# Patient Record
Sex: FEMALE | Race: WHITE | Employment: UNEMPLOYED | URBAN - METROPOLITAN AREA
[De-identification: names, ages, dates, MRNs, and addresses within clinical notes are randomized per-mention and may not be internally consistent; named-entity substitution may affect disease eponyms.]

---

## 2021-03-22 ENCOUNTER — APPOINTMENT (OUTPATIENT)
Dept: GENERAL RADIOLOGY | Age: 81
End: 2021-03-22
Payer: COMMERCIAL

## 2021-03-22 ENCOUNTER — HOSPITAL ENCOUNTER (EMERGENCY)
Age: 81
Discharge: HOME | End: 2021-03-22
Attending: EMERGENCY MEDICINE
Payer: COMMERCIAL

## 2021-03-22 VITALS
HEIGHT: 63 IN | DIASTOLIC BLOOD PRESSURE: 94 MMHG | TEMPERATURE: 98.2 F | SYSTOLIC BLOOD PRESSURE: 166 MMHG | OXYGEN SATURATION: 98 % | BODY MASS INDEX: 20.2 KG/M2 | RESPIRATION RATE: 16 BRPM | HEART RATE: 98 BPM | WEIGHT: 114 LBS

## 2021-03-22 DIAGNOSIS — S92.335A CLOSED NONDISPLACED FRACTURE OF THIRD METATARSAL BONE OF LEFT FOOT, INITIAL ENCOUNTER: Primary | ICD-10-CM

## 2021-03-22 LAB
ABSOLUTE EOS #: 0.1 K/UL (ref 0–0.4)
ABSOLUTE IMMATURE GRANULOCYTE: ABNORMAL K/UL (ref 0–0.3)
ABSOLUTE LYMPH #: 1.4 K/UL (ref 1–4.8)
ABSOLUTE MONO #: 0.5 K/UL (ref 0.1–1.3)
ANION GAP SERPL CALCULATED.3IONS-SCNC: 12 MMOL/L (ref 9–17)
BASOPHILS # BLD: 1 % (ref 0–2)
BASOPHILS ABSOLUTE: 0.1 K/UL (ref 0–0.2)
BUN BLDV-MCNC: 11 MG/DL (ref 8–23)
BUN/CREAT BLD: ABNORMAL (ref 9–20)
CALCIUM SERPL-MCNC: 9.7 MG/DL (ref 8.6–10.4)
CHLORIDE BLD-SCNC: 98 MMOL/L (ref 98–107)
CO2: 25 MMOL/L (ref 20–31)
CREAT SERPL-MCNC: 0.7 MG/DL (ref 0.5–0.9)
DIFFERENTIAL TYPE: ABNORMAL
EOSINOPHILS RELATIVE PERCENT: 1 % (ref 0–4)
GFR AFRICAN AMERICAN: >60 ML/MIN
GFR NON-AFRICAN AMERICAN: >60 ML/MIN
GFR SERPL CREATININE-BSD FRML MDRD: ABNORMAL ML/MIN/{1.73_M2}
GFR SERPL CREATININE-BSD FRML MDRD: ABNORMAL ML/MIN/{1.73_M2}
GLUCOSE BLD-MCNC: 142 MG/DL (ref 70–99)
HCT VFR BLD CALC: 36.4 % (ref 36–46)
HEMOGLOBIN: 12.1 G/DL (ref 12–16)
IMMATURE GRANULOCYTES: ABNORMAL %
LYMPHOCYTES # BLD: 22 % (ref 24–44)
MCH RBC QN AUTO: 28.8 PG (ref 26–34)
MCHC RBC AUTO-ENTMCNC: 33.2 G/DL (ref 31–37)
MCV RBC AUTO: 86.7 FL (ref 80–100)
MONOCYTES # BLD: 8 % (ref 1–7)
NRBC AUTOMATED: ABNORMAL PER 100 WBC
PDW BLD-RTO: 13.1 % (ref 11.5–14.9)
PLATELET # BLD: 201 K/UL (ref 150–450)
PLATELET ESTIMATE: ABNORMAL
PMV BLD AUTO: 8.1 FL (ref 6–12)
POTASSIUM SERPL-SCNC: 3.8 MMOL/L (ref 3.7–5.3)
RBC # BLD: 4.2 M/UL (ref 4–5.2)
RBC # BLD: ABNORMAL 10*6/UL
SEG NEUTROPHILS: 68 % (ref 36–66)
SEGMENTED NEUTROPHILS ABSOLUTE COUNT: 4.3 K/UL (ref 1.3–9.1)
SODIUM BLD-SCNC: 135 MMOL/L (ref 135–144)
URIC ACID: 3.4 MG/DL (ref 2.4–5.7)
WBC # BLD: 6.4 K/UL (ref 3.5–11)
WBC # BLD: ABNORMAL 10*3/UL

## 2021-03-22 PROCEDURE — 99284 EMERGENCY DEPT VISIT MOD MDM: CPT

## 2021-03-22 PROCEDURE — 84550 ASSAY OF BLOOD/URIC ACID: CPT

## 2021-03-22 PROCEDURE — 73630 X-RAY EXAM OF FOOT: CPT

## 2021-03-22 PROCEDURE — 99283 EMERGENCY DEPT VISIT LOW MDM: CPT

## 2021-03-22 PROCEDURE — 85025 COMPLETE CBC W/AUTO DIFF WBC: CPT

## 2021-03-22 PROCEDURE — 80048 BASIC METABOLIC PNL TOTAL CA: CPT

## 2021-03-22 PROCEDURE — 36415 COLL VENOUS BLD VENIPUNCTURE: CPT

## 2021-03-22 PROCEDURE — 73610 X-RAY EXAM OF ANKLE: CPT

## 2021-03-22 ASSESSMENT — PAIN SCALES - GENERAL: PAINLEVEL_OUTOF10: 1

## 2021-03-22 ASSESSMENT — PAIN DESCRIPTION - LOCATION: LOCATION: FOOT

## 2021-03-22 NOTE — ED NOTES
Pt arrived with a complaint of left foot swelling x2-3 weeks. Pt is unsure of any injury to the foot.       Digna Orourke  03/22/21 3877

## 2021-03-22 NOTE — ED NOTES
Patient instructed on post-op shoe use. Pt verbalized understanding and denied any questions at this time.       Reva Jackson  03/22/21 0054

## 2021-03-22 NOTE — ED PROVIDER NOTES
[address]  eMERGENCY dEPARTMENT eNCOUnter      Pt Name: Ting Alexis  MRN: [id number]  [place] 1940  Date of evaluation: 3/22/2021  Provider: Maryan Euceda PA-C    CHIEF COMPLAINT       Chief Complaint   Patient presents with    Foot Swelling     left     Foot Pain           HISTORY OF PRESENT ILLNESS  (Location/Symptom, Timing/Onset, Context/Setting, Quality, Duration, Modifying Factors, Severity.)   Ting Alexis is a 78 y.o. female with hx hypothyroidism presents to the emergency department with complaints of left foot pain and swelling. Patient reports 1 month ago she was reaching up to get something out of a cabinet when she felt a pain in her left foot. Patient states her foot was painful and a little swollen for a week. Patient states symptoms seem to completely improve until 2 weeks ago. Pt thinks a canned good fell onto her left foot 2 weeks ago but is not completely sure. Patient states the pain is a dull pinprick sensation. Reports some pain in her toes. Denies any calf pain or swelling. No numbness or tingling. No chest pain shortness of breath fevers chills nausea. No history of DVT. No history of gout. She is not diabetic. No other complaints. Nursing Notes were reviewed. REVIEW OF SYSTEMS    (2-9 systems for level 4, 10 or more for level 5)     Review of Systems   Foot pain  Foot swelling      Except as noted above the remainder of the review of systems was reviewed and negative.        PAST MEDICAL HISTORY     Past Medical History:   Diagnosis Date    Hypertension     Hypothyroidism     Osteoporosis     Uterovaginal prolapse      None otherwise stated in nurses notes    SURGICAL HISTORY       Past Surgical History:   Procedure Laterality Date    ELBOW SURGERY  2010    TONSILLECTOMY      TUBAL LIGATION      WRIST SURGERY  1996     None otherwise stated in nurses notes    CURRENT MEDICATIONS       Previous Medications    ASCORBIC ACID (VITAMIN C) 500 MG TABLET    Take 500 mg by mouth 2 times daily. CALCIUM CARBONATE 600 MG TABS TABLET    Take 1 tablet by mouth 2 times daily. LEVOTHYROXINE (LEVOTHROID) 50 MCG TABLET    Take 50 mcg by mouth daily. Takes 50 mcg 5 days a week  Then 100 mcg sat and sun. MULTIPLE VITAMINS-IRON (MULTI-VITAMIN/IRON PO)    Take 1 tablet by mouth daily. PROBIOTIC PRODUCT (PROBIOTIC ACIDOPHILUS PO)    Take  by mouth Daily. CLARIFY DOSAGE    VITAMIN E 400 UNIT CAPSULE    Take 400 Units by mouth daily. CLARIFY DOSAGE     VITAMINS-LIPOTROPICS (B-100 PO)    Take 1 tablet by mouth. ALLERGIES     Latex, Morphine, Alendronate sodium, Ceclor [cefaclor], Codeine, Daypro [oxaprozin], Seldane [terfenadine], and Ultram [tramadol hcl]    FAMILY HISTORY     History reviewed. No pertinent family history. No family status information on file. None otherwise stated in nurses notes    SOCIAL HISTORY      reports that she quit smoking about 51 years ago. She has a 4.00 pack-year smoking history. She has never used smokeless tobacco. She reports that she does not drink alcohol or use drugs. lives at home with others     PHYSICAL EXAM    (up to 7 for level 4, 8 or more for level 5)     ED Triage Vitals [03/22/21 1029]   BP Temp Temp Source Pulse Resp SpO2 Height Weight   (!) 166/94 98.2 °F (36.8 °C) Oral 98 16 98 % 5' 3\" (1.6 m) 114 lb (51.7 kg)       Physical Exam   Nursing note and vitals reviewed. Constitutional: Oriented to person, place, and time and well-developed, well-nourished. Head: Normocephalic and atraumatic. Ear: External ears normal.   Nose: Nose normal and midline. Eyes: Conjunctivae and EOM are normal. Pupils are equal, round, and reactive to light. Neck: Normal range of motion. Neck supple. Throat: Posterior pharynx is without erythema or exudates, airway is patent, no swelling  Cardiovascular: Normal rate, regular rhythm, normal heart sounds and intact distal pulses.     Pulmonary/Chest: Effort normal and breath sounds normal. No respiratory distress. No wheezes. No rales. No chest tenderness. Abdominal: Soft. Bowel sounds are normal. No distension and no mass. There is no tenderness. There is no rebound and no guarding. Musculoskeletal: Examination of left foot reveals swelling. No is no erythema or warmth. There is tenderness over the distal metatarsals and 1st-3rd toes. There is no ankle tenderness. 5/5 strength. Brisk cap refill. Distal sensation intact. 2/2 dp and pt pulses. No calf pain or swelling. No calf erythema, warmth. No abrasions or breaks in skin. Ambulatory. Neurological: Alert and oriented to person, place, and time. GCS score is 15. Skin: Skin is warm and dry. No rash noted. No erythema. No pallor. Psychiatric: Mood, memory, affect and judgment normal.           DIAGNOSTIC RESULTS     EKG: All EKG's are interpreted by the Emergency Department Physician who either signs or Co-signs this chart in the absence of a cardiologist.        RADIOLOGY:   All plain film, CT, MRI, and formal ultrasound images (except ED bedside ultrasound) are read by the radiologist, see reports below, unless otherwise noted in MDM or here. XR FOOT LEFT (MIN 3 VIEWS)   Final Result   Subacute healing fracture 3rd metatarsal neck with associated callus   formation and swelling at the forefoot. Background degenerative changes and osseous demineralization which limits   radiographic evaluation. XR ANKLE LEFT (MIN 3 VIEWS)   Final Result   Subacute healing fracture 3rd metatarsal neck with associated callus   formation and swelling at the forefoot. Background degenerative changes and osseous demineralization which limits   radiographic evaluation. No results found.           LABS:  Labs Reviewed   CBC WITH AUTO DIFFERENTIAL - Abnormal; Notable for the following components:       Result Value    Seg Neutrophils 68 (*)     Lymphocytes 22 (*)     Monocytes 8 (*)     All other patient a cane. I recommend using a walker. Pt has been walking around on fracture for 2-3 weeks. If patient does not feel comfortable wearing post op shoe she can wear a normal shoe due to fall risk and follow up with podiatry for recommendations. Recommend no long walks. If up walking always use a walker. Pt is agreeable. Discussed results and plan with the pt. They expressed appropriate understanding. Pt given close follow up, supportive care instructions and strict return instructions at the bedside. ED Medications administered this visit:  Medications - No data to display    New Prescriptions from this visit:    New Prescriptions    No medications on file       Follow-up:  MD Colton [place] [id number] [facility] Daryle [place]fei   [facility] [address]  [place] [id number]  [address] [place], [place] [id number] [phone] [facility]  [phone]    Call           Final Impression:   1.  Closed nondisplaced fracture of third metatarsal bone of left foot, initial encounter               (Please note that portions of this note were completed with a voice recognition program.  Efforts were made to edit the dictations but occasionally words are mis-transcribed.)      (Please note that portions of this note were completed with a voice recognition program.  Efforts were made to edit the dictations but occasionally words are mis-transcribed.)    Micah Frias, [address] [facility], [facility]  03/22/21 5692

## 2021-03-23 NOTE — ED PROVIDER NOTES
[address] ED  eMERGENCY dEPARTMENT eNCOUnter   Independent Attestation     Pt Name: Janna Rahman  MRN: [id number]  [place] 1940  Date of evaluation: 3/22/21   Janna Rahman is a 78 y.o. female who presents with Foot Swelling (left ) and Foot Pain    Vitals:   Vitals:    03/22/21 1029   BP: (!) 166/94   Pulse: 98   Resp: 16   Temp: 98.2 °F (36.8 °C)   TempSrc: Oral   SpO2: 98%   Weight: 114 lb (51.7 kg)   Height: 5' 3\" (1.6 m)     Impression:   1. Closed nondisplaced fracture of third metatarsal bone of left foot, initial encounter      I was personally available for consultation in the Emergency Department. I have reviewed the chart and agree with the documentation as recorded by the [facility], including the assessment, treatment plan and disposition.   Marty Warner MD  Attending Emergency  Physician                          Marty Warner MD  03/22/21 4292

## 2021-03-26 DIAGNOSIS — M79.672 LEFT FOOT PAIN: Primary | ICD-10-CM

## 2021-03-29 ENCOUNTER — OFFICE VISIT (OUTPATIENT)
Dept: ORTHOPEDIC SURGERY | Age: 81
End: 2021-03-29
Payer: COMMERCIAL

## 2021-03-29 VITALS
RESPIRATION RATE: 18 BRPM | BODY MASS INDEX: 20.2 KG/M2 | WEIGHT: 114 LBS | TEMPERATURE: 98.3 F | OXYGEN SATURATION: 98 % | HEIGHT: 63 IN | HEART RATE: 78 BPM

## 2021-03-29 DIAGNOSIS — S92.302A MULTIPLE CLOSED FRACTURES OF METATARSAL BONE OF LEFT FOOT, INITIAL ENCOUNTER: Primary | ICD-10-CM

## 2021-03-29 PROCEDURE — 99203 OFFICE O/P NEW LOW 30 MIN: CPT | Performed by: ORTHOPAEDIC SURGERY

## 2021-03-29 NOTE — PROGRESS NOTES
[facility]  [address] [facility] [address]  Dept: [phone]    Ambulatory Orthopedic Consult      CHIEF COMPLAINT:    Chief Complaint   Patient presents with    Foot Pain     left       HISTORY OF PRESENT ILLNESS:      The patient is a 78 y.o. female who is being seen for evaluation of pain at the above location at the forefoot/midfoot, secondary to an injury that occurred around 2/1/2021. The patient reports that she dropped a canned good on the dorsal aspect of her foot, and then dropped another can of soup on her foot about a week later. She also reports a third instance of bumping her foot and/or dropping something on it prior to this visit since her initial injury. REVIEW OF SYSTEMS:  Constitutional: Negative for fever. HENT: Negative for tinnitus. Eyes: Negative for pain. Respiratory: Negative for shortness of breath. Cardiovascular: Negative for chest pain. Gastrointestinal: Negative for abdominal pain. Genitourinary: Negative for dysuria. Skin: Negative for rash. Neurological: Negative for headaches. Hematological: Does not bruise/bleed easily. Musculoskeletal: See HPI for pertinent positives     Past Medical History:    She  has a past medical history of Hypertension, Hypothyroidism, Osteoporosis, and Uterovaginal prolapse. Past Surgical History:    She  has a past surgical history that includes Elbow surgery (2010); Tubal ligation; Tonsillectomy; and Wrist surgery (1996). Current Medications:     Current Outpatient Medications:     Misc Natural Products (LUTEIN 20) CAPS, Take by mouth, Disp: , Rfl:     calcium carbonate 600 MG TABS tablet, Take 1 tablet by mouth 2 times daily. , Disp: , Rfl:     levothyroxine (LEVOTHROID) 50 MCG tablet, Take 50 mcg by mouth daily.  Takes 50 mcg 5 days a week  Then 100 mcg sat and sun., Disp: , Rfl:     Vitamins-Lipotropics (B-100 PO), Take 1 tablet by mouth.  , Disp: , Rfl:     Multiple Vitamins-Iron (MULTI-VITAMIN/IRON PO), Take 1 tablet by mouth daily. , Disp: , Rfl:     Ascorbic Acid (VITAMIN C) 500 MG tablet, Take 500 mg by mouth 2 times daily. , Disp: , Rfl:     vitamin E 400 UNIT capsule, Take 400 Units by mouth daily. CLARIFY DOSAGE , Disp: , Rfl:      Allergies:    Latex, Morphine, Alendronate sodium, Ceclor [cefaclor], Codeine, Daypro [oxaprozin], Seldane [terfenadine], and Ultram [tramadol hcl]    Family History:  family history is not on file. Social History:   Social History     Occupational History    Not on file   Tobacco Use    Smoking status: Former Smoker     Packs/day: 0.50     Years: 8.00     Pack years: 4.00     Quit date: 12/15/1969     Years since quittin.3    Smokeless tobacco: Never Used   Substance and Sexual Activity    Alcohol use: No    Drug use: No    Sexual activity: Not Currently     Occupation: Retired     OBJECTIVE:  Pulse 78   Temp 98.3 °F (36.8 °C)   Resp 18   Ht 5' 3\" (1.6 m)   Wt 114 lb (51.7 kg)   SpO2 98%   BMI 20.19 kg/m²    Psych: alert and oriented to person, time, and place  Cardio:  well perfused extremities  Resp:  normal respiratory effort  Skin:  no cyanosis  Hem/lymph:  no lymphedema  Neuro:  sensation to light touch grossly intact throughout all nerve distributions in the foot   Musculoskeletal:    MUSCULOSKELETAL (affected lower extremity):  Vascular: Toes warm and well perfused, compartments soft/compressible, mild swelling of ankle/foot. Skin:  Intact over foot/ankle, without rash/lesions/AV malformations.    Motion: Able to wiggle toes  -Range of motion not tested due to pain  -No tenderness at knee or proximal leg   -Tenderness to palpation: Forefoot/midfoot      RADIOLOGY:   3/29/2021 FINDINGS:  Three views (AP, Mortise, and Lateral) of the left ankle and three views (AP, Oblique, Lateral) of the left foot were obtained in the office today and reviewed, revealing fractures at the distal second and third metatarsal shafts with mild shortening, and healing noted. IMPRESSION:  Osseous injury as above. Electronically signed by Adam Mckeon MD      ASSESSMENT AND PLAN:  Body mass index is 20.19 kg/m². She has second and third distal metatarsal shaft fractures with mild shortening, sustained around 2/1/2021. Notably, she has the past medical history as above. She has a history of hypothyroidism and osteoporosis. We had a discussion today about the likely diagnosis and its natural history, physical exam and imaging findings, as well as various treatment options in detail. Surgically, we discussed operative fixation. We did discuss that I do not believe she would benefit from surgery, and she agrees, and wishes to avoid surgery. Orders/referrals were placed as below at today's visit. The patient will avoid the routine use of NSAIDs to prevent the theoretical risk of delayed healing. The patient will avoid pain provoking activity. The patient will use the following, for stability and pain control, when ambulatory:   heel wedge cast shoe. All questions were answered and the above plan was agreed upon. The patient will return to clinic in 1 month with left foot x-rays.           At the patient's next visit, depending on how the patient is doing and/or new imaging/labs results, we may consider the following options:    []  Orthotic (OTC)     []  Orthotic (custom)          []  Rocker bottom shoes     []  Brace (OTC)        []  Brace (custom)             []  CAM boot        []  Night splint         []  Heel cups        []  Strap      []  Toe sleeves/splints    []  PT:                     []  Wean out of immobilization   []  Advance activity       []  Topical               []  NSAIDs          []  Josiah         []  Referral:         []  Stress xrays       []  CT         []  MRI        []  Injection:         []  Consider OR      []  Pick OR date    Return in about 4 weeks (around 4/26/2021). No orders of the defined types were placed in this encounter. No orders of the defined types were placed in this encounter. Kaleigh Mann MD  Orthopedic Surgery        Please excuse any typos/errors, as this note was created with the assistance of voice recognition software. While intending to generate a document that actually reflects the content of the visit, the document can still have some errors including those of syntax and sound-a-like substitutions which may escape proof reading. In such instances, actual meaning can be extrapolated by context.

## 2021-04-21 DIAGNOSIS — M79.672 LEFT FOOT PAIN: Primary | ICD-10-CM

## 2021-04-26 ENCOUNTER — OFFICE VISIT (OUTPATIENT)
Dept: ORTHOPEDIC SURGERY | Age: 81
End: 2021-04-26
Payer: COMMERCIAL

## 2021-04-26 VITALS — WEIGHT: 114 LBS | HEIGHT: 63 IN | HEART RATE: 75 BPM | BODY MASS INDEX: 20.2 KG/M2

## 2021-04-26 DIAGNOSIS — S92.302D MULTIPLE CLOSED FRACTURES OF METATARSAL BONE OF LEFT FOOT WITH ROUTINE HEALING, SUBSEQUENT ENCOUNTER: Primary | ICD-10-CM

## 2021-04-26 PROCEDURE — 99213 OFFICE O/P EST LOW 20 MIN: CPT | Performed by: ORTHOPAEDIC SURGERY

## 2021-04-26 NOTE — PROGRESS NOTES
normal  -Tenderness to palpation: Forefoot/midfoot--improved      RADIOLOGY:   4/26/2021 FINDINGS:  Three views (AP, Oblique, Lateral) of the left foot were obtained in the office today and reviewed, revealing fractures at the distal second and third metatarsal shafts with mild shortening. No interval displacement, with interval healing is noted. IMPRESSION:  Osseous injury as above. Electronically signed by John Oviedo MD      ASSESSMENT AND PLAN:  Body mass index is 20.19 kg/m². She has second and third distal metatarsal shaft fractures with mild shortening, sustained around 2/1/2021. She is doing well overall with conservative management. Notably, she has the past medical history as above. She has a history of hypothyroidism and osteoporosis. We had a discussion today about the likely diagnosis and its natural history, physical exam and imaging findings, as well as various treatment options in detail. Surgically, we discussed that I recommended continuing conservative management, as she is healing well. I did not recommend any surgical intervention at this time. Orders/referrals were placed as below at today's visit. She will continue to avoid the routine use of NSAIDs, and pain from activity. She may continue to advance her activity, and wean out of the heel wedge cast shoe, into a regular supportive shoe. All questions were answered and the above plan was agreed upon. The patient will return to clinic in 8 weeks with repeat left foot x-rays.              At the patient's next visit, depending on how the patient is doing and/or new imaging/labs results, we may consider the following options:    []  Orthotic (OTC)     []  Orthotic (custom)          []  Rocker bottom shoes     []  Brace (OTC)        []  Brace (custom)             []  CAM boot        []  Night splint         []  Heel cups        []  Strap      []  Toe sleeves/splints    []  PT:                     []  Wean out

## 2021-05-29 ENCOUNTER — APPOINTMENT (OUTPATIENT)
Dept: GENERAL RADIOLOGY | Age: 81
DRG: 563 | End: 2021-05-29
Payer: COMMERCIAL

## 2021-05-29 ENCOUNTER — HOSPITAL ENCOUNTER (INPATIENT)
Age: 81
LOS: 4 days | Discharge: NURSING, REHAB OR LONG-TERM CARE | DRG: 563 | End: 2021-06-02
Attending: EMERGENCY MEDICINE | Admitting: INTERNAL MEDICINE
Payer: COMMERCIAL

## 2021-05-29 DIAGNOSIS — S42.291A FRACTURE OF HUMERAL HEAD, CLOSED, RIGHT, INITIAL ENCOUNTER: Primary | ICD-10-CM

## 2021-05-29 LAB
ABSOLUTE BANDS #: 0.34 K/UL (ref 0–1)
ABSOLUTE EOS #: 0 K/UL (ref 0–0.4)
ABSOLUTE IMMATURE GRANULOCYTE: ABNORMAL K/UL (ref 0–0.3)
ABSOLUTE LYMPH #: 0.23 K/UL (ref 1–4.8)
ABSOLUTE MONO #: 0.11 K/UL (ref 0.1–1.3)
ANION GAP SERPL CALCULATED.3IONS-SCNC: 13 MMOL/L (ref 9–17)
BANDS: 3 % (ref 0–10)
BASOPHILS # BLD: 0 % (ref 0–2)
BASOPHILS ABSOLUTE: 0 K/UL (ref 0–0.2)
BUN BLDV-MCNC: 17 MG/DL (ref 8–23)
BUN/CREAT BLD: ABNORMAL (ref 9–20)
CALCIUM SERPL-MCNC: 9.5 MG/DL (ref 8.6–10.4)
CHLORIDE BLD-SCNC: 104 MMOL/L (ref 98–107)
CO2: 26 MMOL/L (ref 20–31)
CREAT SERPL-MCNC: 0.53 MG/DL (ref 0.5–0.9)
DIFFERENTIAL TYPE: ABNORMAL
EOSINOPHILS RELATIVE PERCENT: 0 % (ref 0–4)
GFR AFRICAN AMERICAN: >60 ML/MIN
GFR NON-AFRICAN AMERICAN: >60 ML/MIN
GFR SERPL CREATININE-BSD FRML MDRD: ABNORMAL ML/MIN/{1.73_M2}
GFR SERPL CREATININE-BSD FRML MDRD: ABNORMAL ML/MIN/{1.73_M2}
GLUCOSE BLD-MCNC: 124 MG/DL (ref 70–99)
HCT VFR BLD CALC: 36.9 % (ref 36–46)
HEMOGLOBIN: 12.4 G/DL (ref 12–16)
IMMATURE GRANULOCYTES: ABNORMAL %
LYMPHOCYTES # BLD: 2 % (ref 24–44)
MCH RBC QN AUTO: 29 PG (ref 26–34)
MCHC RBC AUTO-ENTMCNC: 33.5 G/DL (ref 31–37)
MCV RBC AUTO: 86.6 FL (ref 80–100)
MONOCYTES # BLD: 1 % (ref 1–7)
MORPHOLOGY: ABNORMAL
MORPHOLOGY: ABNORMAL
NRBC AUTOMATED: ABNORMAL PER 100 WBC
PDW BLD-RTO: 13.6 % (ref 11.5–14.9)
PLATELET # BLD: 172 K/UL (ref 150–450)
PLATELET ESTIMATE: ABNORMAL
PMV BLD AUTO: 8.8 FL (ref 6–12)
POTASSIUM SERPL-SCNC: 3.7 MMOL/L (ref 3.7–5.3)
RBC # BLD: 4.26 M/UL (ref 4–5.2)
RBC # BLD: ABNORMAL 10*6/UL
SEG NEUTROPHILS: 94 % (ref 36–66)
SEGMENTED NEUTROPHILS ABSOLUTE COUNT: 10.62 K/UL (ref 1.3–9.1)
SODIUM BLD-SCNC: 143 MMOL/L (ref 135–144)
WBC # BLD: 11.3 K/UL (ref 3.5–11)
WBC # BLD: ABNORMAL 10*3/UL

## 2021-05-29 PROCEDURE — 1200000000 HC SEMI PRIVATE

## 2021-05-29 PROCEDURE — 73030 X-RAY EXAM OF SHOULDER: CPT

## 2021-05-29 PROCEDURE — 73080 X-RAY EXAM OF ELBOW: CPT

## 2021-05-29 PROCEDURE — 73110 X-RAY EXAM OF WRIST: CPT

## 2021-05-29 PROCEDURE — 99285 EMERGENCY DEPT VISIT HI MDM: CPT

## 2021-05-29 PROCEDURE — 85025 COMPLETE CBC W/AUTO DIFF WBC: CPT

## 2021-05-29 PROCEDURE — 73070 X-RAY EXAM OF ELBOW: CPT

## 2021-05-29 PROCEDURE — 36415 COLL VENOUS BLD VENIPUNCTURE: CPT

## 2021-05-29 PROCEDURE — 73100 X-RAY EXAM OF WRIST: CPT

## 2021-05-29 PROCEDURE — 80048 BASIC METABOLIC PNL TOTAL CA: CPT

## 2021-05-29 RX ORDER — VITAMIN E 268 MG
400 CAPSULE ORAL DAILY
Status: DISCONTINUED | OUTPATIENT
Start: 2021-05-29 | End: 2021-06-02 | Stop reason: HOSPADM

## 2021-05-29 RX ORDER — ACETAMINOPHEN 325 MG/1
650 TABLET ORAL EVERY 6 HOURS PRN
Status: DISCONTINUED | OUTPATIENT
Start: 2021-05-29 | End: 2021-06-02 | Stop reason: HOSPADM

## 2021-05-29 RX ORDER — ASCORBIC ACID 500 MG
500 TABLET ORAL 2 TIMES DAILY
Status: DISCONTINUED | OUTPATIENT
Start: 2021-05-29 | End: 2021-06-02 | Stop reason: HOSPADM

## 2021-05-29 RX ORDER — LEVOTHYROXINE SODIUM 0.05 MG/1
50 TABLET ORAL DAILY
Status: DISCONTINUED | OUTPATIENT
Start: 2021-05-29 | End: 2021-06-02 | Stop reason: HOSPADM

## 2021-05-29 RX ORDER — CALCIUM CARBONATE 200(500)MG
500 TABLET,CHEWABLE ORAL 2 TIMES DAILY
Status: DISCONTINUED | OUTPATIENT
Start: 2021-05-29 | End: 2021-06-02 | Stop reason: HOSPADM

## 2021-05-29 ASSESSMENT — ENCOUNTER SYMPTOMS
COLOR CHANGE: 0
BACK PAIN: 0
SHORTNESS OF BREATH: 0
EYE PAIN: 0
ABDOMINAL PAIN: 0

## 2021-05-29 ASSESSMENT — PAIN DESCRIPTION - LOCATION: LOCATION: SHOULDER;ARM

## 2021-05-29 ASSESSMENT — PAIN DESCRIPTION - FREQUENCY: FREQUENCY: CONTINUOUS

## 2021-05-29 ASSESSMENT — PAIN DESCRIPTION - ORIENTATION: ORIENTATION: RIGHT

## 2021-05-29 ASSESSMENT — PAIN - FUNCTIONAL ASSESSMENT: PAIN_FUNCTIONAL_ASSESSMENT: PREVENTS OR INTERFERES WITH MANY ACTIVE NOT PASSIVE ACTIVITIES

## 2021-05-29 ASSESSMENT — PAIN DESCRIPTION - PAIN TYPE: TYPE: ACUTE PAIN

## 2021-05-29 ASSESSMENT — PAIN DESCRIPTION - DESCRIPTORS: DESCRIPTORS: ACHING

## 2021-05-29 ASSESSMENT — PAIN SCALES - GENERAL
PAINLEVEL_OUTOF10: 5
PAINLEVEL_OUTOF10: 5

## 2021-05-29 ASSESSMENT — PAIN DESCRIPTION - ONSET: ONSET: ON-GOING

## 2021-05-29 NOTE — ED NOTES
Bed: 08  Expected date:   Expected time:   Means of arrival:   Comments:  Chata Tavera RN  05/29/21 7303

## 2021-05-29 NOTE — ED NOTES
Report given to [facility], LIONEL from [facility]/[employer]. Report method by phone   The following was reviewed with receiving RN:   Current vital signs:  BP (!) 140/81   Pulse 75   Temp 97.8 °F (36.6 °C) (Oral)   Resp 20   Ht 5' 2\" (1.575 m)   Wt 110 lb (49.9 kg)   SpO2 99%   BMI 20.12 kg/m²                MEWS Score: 1     Any medication or safety alerts were reviewed. Any pending diagnostics and notifications were also reviewed, as well as any safety concerns or issues, abnormal labs, abnormal imaging, and abnormal assessment findings. Questions were answered.             Clare Lu RN  05/29/21 7038

## 2021-05-29 NOTE — ED NOTES
Mode of arrival (squad #, walk in, police, etc) : [employer]         Chief complaint(s): fall, shoulder pain        Arrival Note (brief scenario, treatment PTA, etc). : patient states she fell asleep on the toilet last night then attempted to stand up and ambulate to her room when her legs fell asleep and caused her to fall. Patient states she landed on her right shoulder and has had pain since. Patient denies any LOC or head injury. Patient is alert and oriented x4.         C= \"Have you ever felt that you should Cut down on your drinking? \"  No  A= \"Have people Annoyed you by criticizing your drinking? \"  No  G= \"Have you ever felt bad or Guilty about your drinking? \"  No  E= \"Have you ever had a drink as an Eye-opener first thing in the morning to steady your nerves or to help a hangover? \"  No      Deferred []      Reason for deferring: N/A    *If yes to two or more: probable alcohol abuse. Rose Pacheco RN  05/29/21 8071

## 2021-05-29 NOTE — ED PROVIDER NOTES
EMERGENCY DEPARTMENT ENCOUNTER    Pt Name: Terra Gonzales  MRN: [id number]  [place] 1940  Date of evaluation: 5/29/21  CHIEF COMPLAINT       Chief Complaint   Patient presents with    Fall     with right arm pain, pt states did not hit head. HISTORY OF PRESENT ILLNESS   75-year-old female presents complaint of fall and right shoulder pain. Patient reports that she was lying to the bathroom and when she went to get up from the toilet she lost her balance and hit her right arm against the shower. Patient complaining of right shoulder pain and wrist pain, denies hitting her head, denies loss of consciousness, denies any other injuries, denies any dizziness, lightheadedness, chest pain or shortness of breath. The history is provided by the patient. REVIEW OF SYSTEMS     Review of Systems   Constitutional: Negative for fever. HENT: Negative for congestion and ear pain. Eyes: Negative for pain. Respiratory: Negative for shortness of breath. Cardiovascular: Negative for chest pain, palpitations and leg swelling. Gastrointestinal: Negative for abdominal pain. Genitourinary: Negative for dysuria and flank pain. Musculoskeletal: Negative for back pain. Shoulder pain   Skin: Negative for color change. Neurological: Negative for numbness and headaches. Psychiatric/Behavioral: Negative for confusion. All other systems reviewed and are negative.     PASTMEDICAL HISTORY     Past Medical History:   Diagnosis Date    Hypertension     Hypothyroidism     Osteoporosis     Uterovaginal prolapse      Past Problem List  Patient Active Problem List   Diagnosis Code    Hypertension I10    Hypothyroidism E03.9    Hyperlipemia E78.5    Allergic rhinitis J30.9    Fracture of humeral head, closed, right, initial encounter S42.291A     SURGICAL HISTORY       Past Surgical History:   Procedure Laterality Date    ELBOW SURGERY  2010    TONSILLECTOMY      TUBAL LIGATION      WRIST SURGERY       CURRENT MEDICATIONS       Previous Medications    ASCORBIC ACID (VITAMIN C) 500 MG TABLET    Take 500 mg by mouth 2 times daily. CALCIUM CARBONATE 600 MG TABS TABLET    Take 1 tablet by mouth 2 times daily. LEVOTHYROXINE (LEVOTHROID) 50 MCG TABLET    Take 50 mcg by mouth daily. Takes 50 mcg 5 days a week  Then 100 mcg sat and sun. MISC NATURAL PRODUCTS (LUTEIN 20) CAPS    Take by mouth    MULTIPLE VITAMINS-IRON (MULTI-VITAMIN/IRON PO)    Take 1 tablet by mouth daily. VITAMIN E 400 UNIT CAPSULE    Take 400 Units by mouth daily. CLARIFY DOSAGE     VITAMINS-LIPOTROPICS (B-100 PO)    Take 1 tablet by mouth. ALLERGIES     is allergic to latex, morphine, alendronate sodium, ceclor [cefaclor], codeine, daypro [oxaprozin], seldane [terfenadine], and ultram [tramadol hcl]. FAMILY HISTORY     has no family status information on file. SOCIAL HISTORY       Social History     Tobacco Use    Smoking status: Former Smoker     Packs/day: 0.50     Years: 8.00     Pack years: 4.00     Quit date: 12/15/1969     Years since quittin.4    Smokeless tobacco: Never Used   Substance Use Topics    Alcohol use: No    Drug use: No     PHYSICAL EXAM     INITIAL VITALS: BP (!) 140/81   Pulse 75   Temp 97.8 °F (36.6 °C) (Oral)   Resp 20   Ht 5' 2\" (1.575 m)   Wt 110 lb (49.9 kg)   SpO2 99%   BMI 20.12 kg/m²    Physical Exam  Vitals and nursing note reviewed. Constitutional:       General: She is not in acute distress. Appearance: Normal appearance. She is not toxic-appearing. HENT:      Head: Normocephalic and atraumatic. Nose: Nose normal.      Mouth/Throat:      Mouth: Mucous membranes are moist.      Pharynx: Oropharynx is clear. Eyes:      Extraocular Movements: Extraocular movements intact. Conjunctiva/sclera: Conjunctivae normal.   Cardiovascular:      Rate and Rhythm: Normal rate and regular rhythm. Pulses: Normal pulses.       Heart sounds: Normal heart sounds. Pulmonary:      Effort: Pulmonary effort is normal.      Breath sounds: Normal breath sounds. Abdominal:      General: Bowel sounds are normal. There is no distension. Palpations: Abdomen is soft. Tenderness: There is no abdominal tenderness. Musculoskeletal:      Right shoulder: Swelling and bony tenderness present. Decreased range of motion. Normal pulse. Cervical back: Normal range of motion. Skin:     General: Skin is warm and dry. Capillary Refill: Capillary refill takes less than 2 seconds. Neurological:      General: No focal deficit present. Mental Status: She is alert. Psychiatric:         Mood and Affect: Mood normal.         MEDICAL DECISION MAKINyear-old female presents for complaint of right shoulder pain, on initial exam patient in no acute distress vitals are stable, patient is alert noted x4, does have some swelling and tenderness of the right shoulder, neurovascularly intact, will check x-rays    X-rays are reviewed patient showing a right humerus fracture, will place in a sling    Patient currently also with a healing left foot fracture, patient currently lives alone and ambulates with a walker using her right arm, given that the patient ambulates with a walker and lives alone concerned that she will have difficulty caring for herself at home, patient will be admitted for possible rehab placement and orthopedic evaluation    Discussed plan with patient for admission she is agreeable      Spoke with Dr. Dave Almanza who accepts admission with orthopedic surgery consult. Patient demonstrates understanding and agreement with the plan, was given the opportunity to ask questions, and these questions were answered to the best of the provided information at this time. VS stable for transfer. This dictation was prepared using [employer] voice recognition software. CRITICAL CARE:       PROCEDURES:    Procedures    DIAGNOSTIC RESULTS   EKG: All EKG's are interpreted by the Emergency Department Physician who either signs or Co-signs this chart in the absence of a cardiologist.        RADIOLOGY:All plain film, CT, MRI, and formal ultrasound images (except ED bedside ultrasound) are read by the radiologist, see reports below, unless otherwisenoted in MDM or here. XR WRIST RIGHT (2 VIEWS)   Final Result   Displaced fracture proximal right humerus      No other acute bony or joint abnormality         XR SHOULDER RIGHT (MIN 2 VIEWS)   Final Result   Displaced fracture proximal right humerus      No other acute bony or joint abnormality         XR ELBOW RIGHT (2 VIEWS)   Final Result   Displaced fracture proximal right humerus      No other acute bony or joint abnormality           LABS: All lab results were reviewed by myself, and all abnormals are listed below. Labs Reviewed   CBC WITH AUTO DIFFERENTIAL - Abnormal; Notable for the following components:       Result Value    WBC 11.3 (*)     Seg Neutrophils 94 (*)     Lymphocytes 2 (*)     Segs Absolute 10.62 (*)     Absolute Lymph # 0.23 (*)     All other components within normal limits   BASIC METABOLIC PANEL W/ REFLEX TO MG FOR LOW K - Abnormal; Notable for the following components:    Glucose 124 (*)     All other components within normal limits       EMERGENCY DEPARTMENTCOURSE:         Vitals:    Vitals:    05/29/21 1103 05/29/21 1530   BP: (!) 144/63 (!) 140/81   Pulse: 75    Resp: 20    Temp: 97.8 °F (36.6 °C)    TempSrc: Oral    SpO2: 98% 99%   Weight: 110 lb (49.9 kg)    Height: 5' 2\" (1.575 m)        The patient was given the following medications while in the emergency department:  No orders of the defined types were placed in this encounter. CONSULTS:  IP CONSULT TO INTERNAL MEDICINE  IP CONSULT TO ORTHOPEDIC SURGERY    FINAL IMPRESSION      1.  Fracture of humeral head, closed, right, initial encounter          DISPOSITION/PLAN   DISPOSITION Admitted 05/29/2021 05:58:20 PM      PATIENT REFERRED TO:  No follow-up provider specified.   DISCHARGE MEDICATIONS:  New Prescriptions    No medications on file     Go Wayne DO  Attending Emergency Physician                  Go Wayne DO  05/29/21 [id number]

## 2021-05-30 PROBLEM — Z74.09 DECREASED AMBULATION STATUS: Status: ACTIVE | Noted: 2021-05-30

## 2021-05-30 PROBLEM — I35.0 NONRHEUMATIC AORTIC VALVE STENOSIS: Status: ACTIVE | Noted: 2021-05-30

## 2021-05-30 PROCEDURE — 97162 PT EVAL MOD COMPLEX 30 MIN: CPT

## 2021-05-30 PROCEDURE — 97535 SELF CARE MNGMENT TRAINING: CPT

## 2021-05-30 PROCEDURE — 99222 1ST HOSP IP/OBS MODERATE 55: CPT | Performed by: ORTHOPAEDIC SURGERY

## 2021-05-30 PROCEDURE — 1200000000 HC SEMI PRIVATE

## 2021-05-30 PROCEDURE — 97166 OT EVAL MOD COMPLEX 45 MIN: CPT

## 2021-05-30 PROCEDURE — 97116 GAIT TRAINING THERAPY: CPT

## 2021-05-30 PROCEDURE — 99222 1ST HOSP IP/OBS MODERATE 55: CPT | Performed by: INTERNAL MEDICINE

## 2021-05-30 PROCEDURE — 97530 THERAPEUTIC ACTIVITIES: CPT

## 2021-05-30 PROCEDURE — 6370000000 HC RX 637 (ALT 250 FOR IP): Performed by: INTERNAL MEDICINE

## 2021-05-30 RX ADMIN — LEVOTHYROXINE SODIUM 50 MCG: 0.05 TABLET ORAL at 07:06

## 2021-05-30 RX ADMIN — ANTACID TABLETS 500 MG: 500 TABLET, CHEWABLE ORAL at 09:08

## 2021-05-30 RX ADMIN — OXYCODONE HYDROCHLORIDE AND ACETAMINOPHEN 500 MG: 500 TABLET ORAL at 09:08

## 2021-05-30 RX ADMIN — Medication 400 UNITS: at 09:08

## 2021-05-30 RX ADMIN — OXYCODONE HYDROCHLORIDE AND ACETAMINOPHEN 500 MG: 500 TABLET ORAL at 21:34

## 2021-05-30 RX ADMIN — ACETAMINOPHEN 650 MG: 325 TABLET, FILM COATED ORAL at 00:22

## 2021-05-30 ASSESSMENT — PAIN SCALES - GENERAL
PAINLEVEL_OUTOF10: 4
PAINLEVEL_OUTOF10: 5
PAINLEVEL_OUTOF10: 5
PAINLEVEL_OUTOF10: 3

## 2021-05-30 ASSESSMENT — PAIN DESCRIPTION - LOCATION
LOCATION: ARM;SHOULDER
LOCATION: ARM;SHOULDER
LOCATION: SHOULDER;ELBOW

## 2021-05-30 ASSESSMENT — PAIN DESCRIPTION - ORIENTATION
ORIENTATION: RIGHT
ORIENTATION: RIGHT

## 2021-05-30 ASSESSMENT — PAIN DESCRIPTION - PAIN TYPE
TYPE: ACUTE PAIN

## 2021-05-30 ASSESSMENT — PAIN DESCRIPTION - FREQUENCY
FREQUENCY: CONTINUOUS
FREQUENCY: INTERMITTENT

## 2021-05-30 ASSESSMENT — PAIN DESCRIPTION - PROGRESSION: CLINICAL_PROGRESSION: GRADUALLY IMPROVING

## 2021-05-30 ASSESSMENT — PAIN DESCRIPTION - DESCRIPTORS
DESCRIPTORS: ACHING;NUMBNESS
DESCRIPTORS: ACHING;DULL
DESCRIPTORS: ACHING;NUMBNESS

## 2021-05-30 ASSESSMENT — PAIN DESCRIPTION - ONSET: ONSET: ON-GOING

## 2021-05-30 NOTE — CARE COORDINATION
CASE MANAGEMENT NOTE:    Admission Date:  5/29/2021 Megha Hussein is a 78 y.o.  female    Admitted for : Fracture of humeral head, closed, right, initial encounter Ricky Timmons    Met with:  Patient    PCP:  States she sees Dr. Radha Colón 1x/year and does not want a PCP                                Insurance:  [employer] [facility]      Is patient alert and oriented at time of discussion:  Yes    Current Residence/ Living Arrangements:  independently at home             Current Services PTA:  No    Does patient go to outpatient dialysis: No  If yes, location and chair time: N/A    Is patient agreeable to VNS: Yes    Freedom of choice provided:  Yes    List of [address] provided: Yes    VNS chosen:  Yes - [facility]    DME: straight cane & GB    Home Oxygen: No    Nebulizer: No    CPAP/BIPAP: No    Supplier: N/A    Potential Assistance Needed: Yes    SNF needed: Maybe - Would want [facility]    [place] of choice and list provided: Yes    [facility]:  [facility]       Does Patient want to use MEDS to BEDS? No    Is patient currently receiving oral anticoagulation therapy? No    Is the Patient an [facility] with Readmission Risk Score greater than 14%? No  If yes, pt needs a follow up appointment made within 7 days. Family Members/Caregivers that pt would like involved in their care:    Yes    If yes, list name here:  Daughter Hany Steve and son Bg    Transportation Provider:  Family             Discharge Plan:  5/30: [address] apartment on [address]st floor. DME - cane, GB. Wants  [address] - Referral sent. If needs surgery, may need SNF and would be open to [address] [address] Awaiting ortho input.  LAUREANO NEEDS SIGNED/COMPLETED. [facility]                  Electronically signed by: Russell Higgins RN on 5/30/2021 at 11:03 AM

## 2021-05-30 NOTE — H&P
[place] [address] Internal Medicine    CONSULTATION / HISTORY AND PHYSICAL EXAMINATION            Date:   5/30/2021  Patient name:  Frederica Leyden  Date of admission:  5/29/2021 10:35 AM  MRN:   [id number]  Account:  [de-identified]  YOB: 1940  PCP:    No primary care provider on file. Room:   2071/2071-01  Code Status:    Full Code    Physician Requesting Consult: Laz Officer, MD    Reason for Consult:  medical management    Chief Complaint:     Chief Complaint   Patient presents with   Dmitry Close     with right arm pain, pt states did not hit head. History Obtained From:     Patient medical record nursing staff    History of Present Illness:   Patient past medical history multiple medical problems from hypothyroidism, hypertension, mild degree of aortic stenosis, patient had fractured her left foot after a jar  fell on her left foot in February, she was using a cane and walker, ever since, yesterday, she went to bathroom, she fell asleep on the toilet seat, when she tried to get up from the toilet seat, she lost balance, fell, injured her right arm, she noticed pain, swelling in right arm that make her come to the emergency room, she was found to have displaced fracture of right humerus,  In the emergency room, sling was applied to right humerus, patient as of now, is pain-free,     Past Medical History:     Past Medical History:   Diagnosis Date    Hypertension     Hypothyroidism     Osteoporosis     Uterovaginal prolapse         Past Surgical History:     Past Surgical History:   Procedure Laterality Date    ELBOW SURGERY  2010    TONSILLECTOMY      TUBAL LIGATION      WRIST SURGERY  1996        Medications Prior to Admission:     Prior to Admission medications    Medication Sig Start Date End Date Taking?  Authorizing Provider   Misc Natural Products (LUTEIN 20) CAPS Take by mouth   Yes Historical Provider, MD   calcium carbonate 600 MG TABS tablet Take 1 tablet by mouth 2 times daily. Yes Historical Provider, MD   levothyroxine (LEVOTHROID) 50 MCG tablet Take 50 mcg by mouth daily. Takes 50 mcg 5 days a week  Then 100 mcg sat and sun. Yes Historical Provider, MD   Vitamins-Lipotropics (B-100 PO) Take 1 tablet by mouth. Yes Historical Provider, MD   Multiple Vitamins-Iron (MULTI-VITAMIN/IRON PO) Take 1 tablet by mouth daily. Yes Historical Provider, MD   Ascorbic Acid (VITAMIN C) 500 MG tablet Take 500 mg by mouth 2 times daily. Yes Historical Provider, MD   vitamin E 400 UNIT capsule Take 400 Units by mouth daily    Yes Historical Provider, MD        Allergies:     Latex, Morphine, Alendronate sodium, Ceclor [cefaclor], Codeine, Daypro [oxaprozin], Seldane [terfenadine], and Ultram [tramadol hcl]    Social History:     Tobacco:    reports that she quit smoking about 51 years ago. She has a 4.00 pack-year smoking history. She has never used smokeless tobacco.  Alcohol:      reports no history of alcohol use. Drug Use:  reports no history of drug use. Family History:     History reviewed. No pertinent family history. Review of Systems:     Positive and Negative as described in HPI. CONSTITUTIONAL:  negative for fevers, chills, sweats, fatigue, weight loss  HEENT:  negative for vision, hearing changes, runny nose, throat pain  RESPIRATORY:  negative for shortness of breath, cough, congestion, wheezing. CARDIOVASCULAR:  negative for chest pain, palpitations.   GASTROINTESTINAL:  negative for nausea, vomiting, diarrhea, constipation, change in bowel habits, abdominal pain   GENITOURINARY:  negative for difficulty of urination, burning with urination, frequency   INTEGUMENT:  negative for rash, skin lesions, easy bruising   HEMATOLOGIC/LYMPHATIC:  negative for swelling/edema   ALLERGIC/IMMUNOLOGIC:  negative for urticaria , itching  ENDOCRINE:  negative increase in drinking, increase in urination, hot or cold intolerance  MUSCULOSKELETAL:  Pain in Right Arm   NEUROLOGICAL:  negative for headaches, dizziness, lightheadedness, numbness, pain, tingling extremities  BEHAVIOR/PSYCH:      Physical Exam:     BP (!) 140/81   Pulse 79   Temp 98 °F (36.7 °C) (Oral)   Resp 16   Ht 5' 2\" (1.575 m)   Wt 110 lb (49.9 kg)   SpO2 100%   BMI 20.12 kg/m²   Temp (24hrs), Av °F (36.7 °C), Min:97.8 °F (36.6 °C), Max:98.1 °F (36.7 °C)    No results for input(s): POCGLU in the last 72 hours. Intake/Output Summary (Last 24 hours) at 2021 0901  Last data filed at 2021 0710  Gross per 24 hour   Intake --   Output 600 ml   Net -600 ml       General Appearance:  alert, well appearing, and in no acute distress  Mental status: oriented to person, place, and time with normal affect  Head:  normocephalic, atraumatic. Eye: no icterus, redness, pupils equal and reactive, extraocular eye movements intact, conjunctiva clear  Ear: normal external ear, no discharge, hearing intact  Nose:  no drainage noted  Mouth: mucous membranes moist  Neck: supple, no carotid bruits, thyroid not palpable  Lungs: Bilateral equal air entry, clear to ausculation, no wheezing, rales or rhonchi, normal effort  Cardiovascular: normal rate, regular rhythm, systolic murmur in aortic area, gallop, rub. Abdomen: Soft, nontender, nondistended, normal bowel sounds, no hepatomegaly or splenomegaly  Neurologic: There are no new focal motor or sensory deficits, normal muscle tone and bulk, no abnormal sensation, normal speech, cranial nerves II through XII grossly intact  Skin: No gross lesions, rashes, bruising or bleeding on exposed skin area  Extremities:  Right arm in sling , tenderness left foot  Psych:      Investigations:      Laboratory Testing:  Recent Results (from the past 24 hour(s))   CBC Auto Differential    Collection Time: 21  3:49 PM   Result Value Ref Range    WBC 11.3 (H) 3.5 - 11.0 k/uL    RBC 4.26 4.0 - 5.2 m/uL    Hemoglobin 12.4 12.0 - 16.0 g/dL    Hematocrit 36.9 36 - 46 %    MCV 86.6 80 - 100 fL    MCH 29.0 26 - 34 pg    MCHC 33.5 31 - 37 g/dL    RDW 13.6 11.5 - 14.9 %    Platelets 218 954 - 956 k/uL    MPV 8.8 6.0 - 12.0 fL    NRBC Automated NOT REPORTED per 100 WBC    Differential Type NOT REPORTED     Immature Granulocytes NOT REPORTED 0 %    Absolute Immature Granulocyte NOT REPORTED 0.00 - 0.30 k/uL    WBC Morphology NOT REPORTED     RBC Morphology NOT REPORTED     Platelet Estimate NOT REPORTED     Seg Neutrophils 94 (H) 36 - 66 %    Lymphocytes 2 (L) 24 - 44 %    Monocytes 1 1 - 7 %    Eosinophils % 0 0 - 4 %    Basophils 0 0 - 2 %    Bands 3 0 - 10 %    Segs Absolute 10.62 (H) 1.3 - 9.1 k/uL    Absolute Lymph # 0.23 (L) 1.0 - 4.8 k/uL    Absolute Mono # 0.11 0.1 - 1.3 k/uL    Absolute Eos # 0.00 0.0 - 0.4 k/uL    Basophils Absolute 0.00 0.0 - 0.2 k/uL    Absolute Bands # 0.34 0.0 - 1.0 k/uL    Morphology ANISOCYTOSIS PRESENT     Morphology 1+ TEARDROPS    Basic Metabolic Panel w/ Reflex to MG    Collection Time: 05/29/21  3:49 PM   Result Value Ref Range    Glucose 124 (H) 70 - 99 mg/dL    BUN 17 8 - 23 mg/dL    CREATININE 0.53 0.50 - 0.90 mg/dL    Bun/Cre Ratio NOT REPORTED 9 - 20    Calcium 9.5 8.6 - 10.4 mg/dL    Sodium 143 135 - 144 mmol/L    Potassium 3.7 3.7 - 5.3 mmol/L    Chloride 104 98 - 107 mmol/L    CO2 26 20 - 31 mmol/L    Anion Gap 13 9 - 17 mmol/L    GFR Non-African American >60 >60 mL/min    GFR African American >60 >60 mL/min    GFR Comment          GFR Staging NOT REPORTED            Consultations:   IP CONSULT TO INTERNAL MEDICINE  IP CONSULT TO ORTHOPEDIC SURGERY  IP CONSULT TO SOCIAL WORK  Assessment :      Primary Problem  <principal problem not specified>    Active Hospital Problems    Diagnosis Date Noted    Fracture of humeral head, closed, right, initial encounter Eris Hammonds 05/29/2021     Active Problems:    Hypertension    Hypothyroidism    Fracture of humeral head, closed, right, initial encounter    Nonrheumatic aortic valve stenosis  Resolved Problems:    * No resolved hospital problems. *      Plan:     Right sided displaced humerus fracture, orthopedic surgery consulted, right arm in sling  Patient has mild degree of arctic stenosis, no echocardiogram in the system, patient mention she has seen her cardiologist 6 years ago, patient before her foot fracture was very active, able to walk 1 block easily  Hypertension, patient does not want to be on any medication for blood pressure control, understand the risks  On DVT prophylaxis Lovenox  PT/OT consulted   consulted for placement, although patient is reluctant to go to nursing home, if no surgical intervention planned by orthopedic surgery (she appears reluctant to undergo surgical procedure as well,), and patient does not want to go to nursing facility will work on discharge planning  If there will be, plan for surgical intervention, in that case, patient need to undergo echocardiogram to look for degree of aortic stenosis since patient did not have any work-up in last 6 years        Bruce Causey MD  5/30/2021  9:33 AM    Copy sent to Dr. Dolores Russ primary care provider on file. Please note that this chart was generated using voice recognition Dragon dictation software. Although every effort was made to ensure the accuracy of this automated transcription, some errors in transcription may have occurred.

## 2021-05-30 NOTE — H&P
History and Physical        CHIEF COMPLAINT:  Right shoulder pain    HISTORY OF PRESENT ILLNESS:      The patient is a 78 y.o. female  who presents with fall of toilet post falling asleep legs fell asleep. Past Medical History:    Past Medical History:   Diagnosis Date    Hypertension     Hypothyroidism     Osteoporosis     Uterovaginal prolapse        Past Surgical History:    Past Surgical History:   Procedure Laterality Date    ELBOW SURGERY  2010    TONSILLECTOMY      TUBAL LIGATION      WRIST SURGERY  1996       Medications Prior to Admission:   Prior to Admission medications    Medication Sig Start Date End Date Taking? Authorizing Provider   Misc Natural Products (LUTEIN 20) CAPS Take by mouth   Yes Historical Provider, MD   calcium carbonate 600 MG TABS tablet Take 1 tablet by mouth 2 times daily. Yes Historical Provider, MD   levothyroxine (LEVOTHROID) 50 MCG tablet Take 50 mcg by mouth daily. Takes 50 mcg 5 days a week  Then 100 mcg sat and sun. Yes Historical Provider, MD   Vitamins-Lipotropics (B-100 PO) Take 1 tablet by mouth. Yes Historical Provider, MD   Multiple Vitamins-Iron (MULTI-VITAMIN/IRON PO) Take 1 tablet by mouth daily. Yes Historical Provider, MD   Ascorbic Acid (VITAMIN C) 500 MG tablet Take 500 mg by mouth 2 times daily.    Yes Historical Provider, MD   vitamin E 400 UNIT capsule Take 400 Units by mouth daily    Yes Historical Provider, MD    Scheduled Meds:   enoxaparin  40 mg Subcutaneous Daily    vitamin C  500 mg Oral BID    calcium carbonate  500 mg Oral BID    levothyroxine  50 mcg Oral Daily    vitamin E  400 Units Oral Daily     Continuous Infusions:  PRN Meds:.acetaminophen      Allergies:  Latex, Morphine, Alendronate sodium, Ceclor [cefaclor], Codeine, Daypro [oxaprozin], Seldane [terfenadine], and Ultram [tramadol hcl]    Social History:   Social History     Occupational History    Not on file   Tobacco Use    Smoking status: Former Smoker Packs/day: 0.50     Years: 8.00     Pack years: 4.00     Quit date: 12/15/1969     Years since quittin.4    Smokeless tobacco: Never Used   Substance and Sexual Activity    Alcohol use: No    Drug use: No    Sexual activity: Not Currently        Family History:  History reviewed. No pertinent family history. REVIEW OF SYSTEMS:  Gen: Negative for nausea, vomiting, diarrhea, fever, chills, night sweats  Heart: Negative for HTN, palpitations, chest pain  Lungs: Negative for wheezes, asthma or SOB  GI: Negative for nausea, vomiting  Endo: Negative for diabetes  Heme: Negative for DVT       PHYSICAL EXAM:  Patient Vitals for the past 24 hrs:   BP Temp Temp src Pulse Resp SpO2   21 1243 119/85 97.7 °F (36.5 °C) Oral 78 18 97 %   21 0713 (!) 140/81 98 °F (36.7 °C) Oral 79 16 100 %   21 2004 (!) 146/80 98 °F (36.7 °C) Oral 76 16 100 %   21 1918 (!) 149/84 98.1 °F (36.7 °C) Oral 76 18 99 %   21 1530 (!) 140/81 -- -- -- -- 99 %     Gen: alert and oriented  Head: normorcephalic, atraumatic  Neck: supple  Heart: RRR  Lungs: No audible wheezes  Abdomen: soft  Pelvis: stable  Extremity right arm in sling    DATA:  CBC:   Lab Results   Component Value Date    WBC 11.3 2021    HGB 12.4 2021     2021     2011     BMP:    Lab Results   Component Value Date     2021    K 3.7 2021     2021    CO2 26 2021    BUN 17 2021    CREATININE 0.53 2021    CALCIUM 9.5 2021    GLUCOSE 124 2021    GLUCOSE 87 2011     PT/INR:  No results found for: PROTIME, INR  Troponin:  No results found for: Giulia Junior    Radiology: varus humeral neck fracture    ASSESSMENT: Active Problems:    Hypertension    Hypothyroidism    Fracture of humeral head, closed, right, initial encounter    Nonrheumatic aortic valve stenosis    Decreased ambulation status  Resolved Problems:    * No resolved hospital problems.  * PLAN:  1)  PT  eval for placement        Julian Hardy MD

## 2021-05-30 NOTE — PROGRESS NOTES
includes Elbow surgery (2010); Tubal ligation; Tonsillectomy; and Wrist surgery (1996). Restrictions  Restrictions/Precautions  Restrictions/Precautions: Fall Risk  Required Braces or Orthoses?: Yes (sling right arm, peripheral IV right proximal forearm)  Upper Extremity Weight Bearing Restrictions  Right Upper Extremity Weight Bearing: Non Weight Bearing  Required Braces or Orthoses  Right Upper Extremity Brace/Splint: Sling    Position Activity Restriction  Other position/activity restrictions: Right Upper Extremity  non-weight-bearing, no rotation shoulder  Vision/Hearing  Vision: Impaired  Vision Exceptions: Wears glasses for reading  Hearing: Within functional limits     Subjective  General  Patient assessed for rehabilitation services?: Yes  Response To Previous Treatment: Not applicable  Family / Caregiver Present: No  Referring Practitioner: Dr. Kenyatta Tucker  Referral Date : 05/30/21  Diagnosis: fracture right humeral head  Follows Commands: Within Functional Limits  Other (Comment): OK per nurse Ronit Pathak to proceed w/ PT evaluation  Subjective  Subjective: pt reports that she fell while standing up from the toilet. Pt reports that she had fallen asleep on the commode and her legs were numb when she attempted to stand up and fell. Pt reports she fell onto the right arm striking the walk in shower. Pt reports hx of recent left 3rd metatarsal head fracture.   Pain Screening  Patient Currently in Pain: Yes  Pain Assessment  Pain Assessment: 0-10  Pain Level: 4  Patient's Stated Pain Goal: No pain  Pain Type: Acute pain  Pain Location: Shoulder;Elbow  Pain Orientation: Right  Pain Descriptors: Aching;Dull  Pain Frequency: Continuous  Pain Onset: On-going  Clinical Progression: Gradually improving  Non-Pharmaceutical Pain Intervention(s): Ambulation/Increased Activity;Repositioned  Response to Pain Intervention: Patient Satisfied  Multiple Pain Sites: No  Vital Signs  Patient Currently in Pain: Yes Orientation  Orientation  Overall Orientation Status: Within Functional Limits (answered all questions correctly)  Social/Functional History  Social/Functional History  Lives With: Alone  Type of Home: Apartment ([address] living apartment (19 years - one of the first ones there ))  Home Layout: One level  Home Access: Level entry  Bathroom Shower/Tub: Walk-in shower, Curtain  Bathroom Toilet: Handicap height  Bathroom Equipment: Grab bars in shower, Hand-held shower  Bathroom Accessibility: Walker accessible  Home Equipment: Fidel Guido, [address] (Has used a Cane since her left foot injury earlier this year)  ADL Assistance: [address]: Needs assistance (son and dtr-in-law assisting w/ laundry & grocery shopping)  Homemaking Responsibilities: Yes  Ambulation Assistance: Independent (Used a cane since a left metatarsal fracture)  Transfer Assistance: Independent  Active : No  Mode of Transportation: Other, Family, Friends  Occupation: Retired  Type of occupation: [address] [employer] work  IADL Comments: sleeps in a flat bed  Additional Comments: son and dtr-in-law assisting w/ laundry & grocery shopping, friend Mary Pipmaames assist as needed for groceries  Cognition        Objective     Observation/Palpation  Observation: sling right arm, peripheral IV right proximal forearm    AROM RLE (degrees)  RLE AROM: WFL  AROM LLE (degrees)  LLE AROM : WFL  LLE General AROM: except left ankle dorsiflexion limited  due to hx of recent left 3rd metatarsal head fracture  AROM RUE (degrees)  RUE General AROM: sling right arm- see OT for UE assessment  AROM LUE (degrees)  LUE General AROM: see OT for UE assessment  Strength RLE  Comment: grossly 4/5  Strength LLE  Comment: grossly 4/5 except left ankle NT due to hx of recent left 3rd metatarsal head fracture  Strength RUE  Comment: sling right arm- see OT for UE assessment  Strength LUE  Comment: see OT for UE assessment Sensation  Overall Sensation Status: Impaired (C/O numbness right hand)  Bed mobility  Rolling to Left: Minimal assistance  Rolling to Right:  (deferred rolling to the right due to right humeral fracture/ sling)  Supine to Sit: Moderate assistance  Sit to Supine: Moderate assistance  Comment: dangled at the EOB w/ SBA x 1; therapist assisted lifting legs and lowering trunk when getting back into bed  Transfers  Sit to Stand: Minimal Assistance  Stand to sit: Minimal Assistance  Stand Pivot Transfers: Minimal Assistance (used s cane)  Comment: therapist assisted lifting and lowering  pt to sit  Ambulation  Ambulation?: Yes  Ambulation 1  Surface: level tile  Device: Single point cane  Assistance: Minimal assistance  Gait Deviations: Deviated path; Slow Jeanne  Distance: 40' x 2 w/ 2 standing turns  Comments: LOB x 1 w/ assist to correct, sling right arm, hx recent left 3rd metatarsal head fracture (wearing padded slipper)  Stairs/Curb  Stairs?: No (level entry apartment)     Balance  Sitting - Static: Good  Sitting - Dynamic: Good  Standing - Static: Good;- (used s cane)  Standing - Dynamic: Fair (used s cane)        Plan   Plan  Times per week: 5-7 treatments/ week  Times per day:  (5-7 treatments/ week)  Specific instructions for Next Treatment: advance gait distance using s cane, instruct in exercise; FALL RISK  Current Treatment Recommendations: Strengthening, Safety Education & Training, Balance Training, Endurance Training, Patient/Caregiver Education & Training, Equipment Evaluation, Education, & procurement, Functional Mobility Training, Transfer Training, Gait Training  Safety Devices  Type of devices:  All fall risk precautions in place, Bed alarm in place, Call light within reach, Gait belt, Patient at risk for falls, Left in bed, Nurse notified (nurse Godfrey Knowles)    G-Code       OutComes Score                                                  AM-PAC Score     AM-PAC Inpatient Mobility without Stair Climbing Raw

## 2021-05-30 NOTE — DISCHARGE INSTR - COC
(49.9 kg)   SpO2 100%   BMI 20.12 kg/m²     Last documented pain score (0-10 scale): Pain Level: 5  Last Weight:   Wt Readings from Last 1 Encounters:   05/29/21 110 lb (49.9 kg)     Mental Status:  oriented and alert    IV Access:  - None    Nursing Mobility/ADLs:  Walking   Assisted  Transfer  Assisted  Bathing  Assisted  Dressing  Assisted  Toileting  Independent  Feeding  [address]  Independent  Med Delivery   whole    Wound Care Documentation and Therapy:        Elimination:  Continence:   · Bowel: Yes  · Bladder: Yes  Urinary Catheter: None   Colostomy/Ileostomy/Ileal Conduit: No           Intake/Output Summary (Last 24 hours) at 5/30/2021 1111  Last data filed at 5/30/2021 1011  Gross per 24 hour   Intake --   Output 900 ml   Net -900 ml     I/O last 3 completed shifts:  In: -   Out: 400 [Urine:400]    Safety Concerns: At Risk for Falls    Impairments/Disabilities:      Fracture RIGHT humerus. Arm in sling. Nutrition Therapy:  Current Nutrition Therapy:   - Oral Diet:  General    Routes of Feeding: Oral  Liquids: No Restrictions  Daily Fluid Restriction: no  Last Modified Barium Swallow with Video (Video Swallowing Test): not done    Treatments at the Time of Hospital Discharge:   Respiratory Treatments: N/A  Oxygen Therapy:  is not on home oxygen therapy.   Ventilator:    - No ventilator support    Rehab Therapies: Physical Therapy and Occupational Therapy  Weight Bearing Status/Restrictions: No weight bearing restirctions  Other Medical Equipment (for information only, NOT a DME order):  cane  Other Treatments: Skilled Nursing Assessment, Medication Education and monitoring    Patient's personal belongings (please select all that are sent with patient):  None    RN SIGNATURE:  Electronically signed by Armand Mcallister RN on 6/2/21 at 11:52 AM EDT    CASE MANAGEMENT/SOCIAL WORK SECTION    Inpatient Status Date: 5/29/2021    Readmission Risk Assessment Score:  Readmission Risk

## 2021-05-30 NOTE — PROGRESS NOTES
strap to hold arm close to body due to proximal humerus fracture:  Sling straps were adjusted for fit and modified with a pull tab to assist patient with donning/doffing sling  Required Braces or Orthoses?: Yes (sling right arm, peripheral IV right proximal forearm)     Vitals  Temp: 98 °F (36.7 °C)  Pulse: 79  Resp: 16  BP: (!) 140/81  Height: 5' 2\" (157.5 cm)  Weight: 110 lb (49.9 kg)  BMI (Calculated): 20.2  Oxygen Therapy  SpO2: 100 %  O2 Device: None (Room air)  Level of Consciousness: Alert (0)    Subjective  Subjective: Doesn't want surgery unless neccesary;   Having pain with general movement;  Comments: Dors not have much support from family - they all work  Overall Orientation Status: Within [facility]: Impaired  Vision Exceptions: Wears glasses for reading  Hearing  Hearing: Within functional limits  Social/Functional History  Lives With: Alone  Type of Home: Apartment ([address] [place] [address],[address] Floor living apartment (19 years - one of the first ones there ))  Home Layout: One level  Home Access: Level entry  Bathroom Shower/Tub: Walk-in shower, Curtain  Bathroom Toilet: Handicap height  Bathroom Equipment: Grab bars in shower, Hand-held shower  Bathroom Accessibility: Walker accessible  Home Equipment: Sandra Herrera (Has used a Cane since her left foot injury earlier this year)  ADL Assistance: Independent  Homemaking Assistance: Needs assistance (son and dtr-in-law assisting w/ laundry & grocery shopping)  Homemaking Responsibilities: Yes  Ambulation Assistance: Independent (Used a cane since a left metatarsal fracture)  Transfer Assistance: Independent  Active : No  Mode of Transportation: Other, Family, Friends  Occupation: Retired  Type of occupation: [address] [employer] work  IADL Comments: sleeps in a flat bed  Additional Comments: son and dtr-in-law assisting w/ laundry & grocery shopping, friend Deirdre Transathya assist as needed for groceries  Pain Assessment  Pain Assessment: 0-10  Pain Level: 5  Pain Type: Acute pain  Pain Location: Arm, Shoulder  Pain Orientation: Right  Pain Descriptors: Aching, Numbness  Pain Frequency: Intermittent    Objective      Cognition  Overall Cognitive Status: WFL  Following Commands: Follows one step commands with increased time  Attention Span: Difficulty dividing attention  Memory: Decreased recall of precautions  Safety Judgement: Decreased awareness of need for assistance, Decreased awareness of need for safety  Problem Solving: Assistance required to generate solutions, Assistance required to implement solutions, Assistance required to identify errors made, Decreased awareness of errors  Insights: Decreased awareness of deficits  Initiation: Requires cues for some  Sequencing: Requires cues for some       ADL  Feeding: Setup, Contact guard assistance, Increased time to complete, Dentures (Has a partial denture at home)  Grooming: Moderate assistance  UE Bathing: Maximum assistance  LE Bathing: Maximum assistance  UE Dressing: Dependent/Total  LE Dressing:  Moderate assistance  Toileting: Minimal assistance    UE Function  Hand Dominance  Hand Dominance: Right        LUE Strength  Gross LUE Strength: WFL  L Hand General: 4/5     LUE Tone: Normotonic     LUE AROM (degrees)  LUE AROM : WFL     Left Hand AROM (degrees)  Left Hand AROM: WFL  RUE Strength  R Hand General: 4-/5  RUE Strength Comment: Not tested - Proximal Humerus fracture - arm in contrained arm sling      RUE Tone: Normotonic     RUE AROM (degrees)  RUE General AROM: Not tested - Proximal Humeral Fracture able to move wrist and hand     Right Hand AROM (degrees)  Right Hand AROM: WFL    Fine Motor Skills  Coordination  Movements Are Fluid And Coordinated: No  Coordination and Movement description: Gross motor impairments, Right UE   Comment: Right UE Proximal humerus fracture               Quality of Movement Other  Comment: Right UE Proximal humerus fracture       Mobility Balance  Sitting Balance: Modified independent   Standing Balance: Contact guard assistance     Functional Mobility  Functional - Mobility Device: Cane  Activity: To/from bathroom  Assist Level: Minimal assistance  Functional Mobility Comments: Unable to get up without assistance from bedside chair, toilet        Transfers  Sit to stand: Moderate assistance  Stand to sit: Minimal assistance  Transfer Comments: cuing for safety with techniques, hand placement and positioning      Assessment  Performance deficits / Impairments: Decreased functional mobility , Decreased ADL status, Decreased strength, Decreased safe awareness, Decreased endurance, Decreased coordination  Treatment Diagnosis: Impaired ability to care for self related to arm fracture  Prognosis: Good  Decision Making: High Complexity  History:  Moderate chart review and discussion with patient regarding needs and options to meet those needs - reports her family is not able to assist - she would not be able to stay with them or have someone stay with her  Exam: 6 areas of altered performance  REQUIRES OT FOLLOW UP: Yes  Discharge Recommendations: Continue to assess pending progress, Patient would benefit from continued therapy after discharge  Activity Tolerance: Patient limited by fatigue, Patient limited by pain         Functional Outcome Measures  AM-PAC Daily Activity Inpatient   How much help for putting on and taking off regular lower body clothing?: A Lot  How much help for Bathing?: A Lot  How much help for Toileting?: A Lot  How much help for putting on and taking off regular upper body clothing?: Total  How much help for taking care of personal grooming?: A Lot  How much help for eating meals?: A Little  AM-[facility] Inpatient Daily Activity Raw Score: 12  AM-PAC Inpatient ADL T-Scale Score : 30.6  ADL Inpatient CMS 0-100% Score: 66.57  ADL Inpatient CMS G-Code Modifier : CL       Goals  Patient Goals   Patient goals : Be able to care for herself to return to her apartment  Short term goals  Time Frame for Short term goals: 1-5 days  Short term goal 1: Tolerate 10-25  minutes of self care without increased pain or excessive fatigue  Short term goal 2: D/V understanding of Modified care strategies and use of devices to support self care independence and safety  Short term goal 3: D/V understanding of Modified functional mobility / transfer safety and independnece as needed for return to home  Short term goal 4: D/V understanding of One handed self care techniques with application to care needs  Short term goal 5: Demonstrate safe positioning of Right UE, Sling management (doff/homa) and swelling control with < 3 cues    Plan  Safety Devices  Safety Devices in place: Yes  Type of devices: Left in chair, Call light within reach     Plan  Times per week: 2-5 sessions  Times per day: Twice a day  Current Treatment Recommendations: Strengthening, Functional Mobility Training, Endurance Training, Pain Management, Safety Education & Training, Patient/Caregiver Education & Training, Positioning, Self-Care / ADL, Home Management Training  OT Education  OT Education: OT Role, Plan of Care, Home Exercise Program, Precautions, ADL Adaptive Strategies, Transfer Training, Orientation, Equipment  Patient Education: One-handed self care strategies, modified care techniques, fall prevention    OT Equipment Recommendations  Equipment Needed: Yes  Mobility Devices: ADL Assistive Devices  ADL Assistive Devices: Feeding Devices, Grab Bars - toilet  Other: Arm sling strapping was adjusted for fit and modified pull-tabs installed to increase her ability to self adjust / doff-homa arm sling  for care and comfort  OT Individual Minutes  Time In: 2974  Time Out: 5 [facility]  Minutes: 55    Electronically signed by Niki Rothman OT on 5/30/21 at 12:42 PM EDT

## 2021-05-30 NOTE — PLAN OF CARE
Problem: Falls - Risk of:  Goal: Will remain free from falls  Description: Will remain free from falls  Outcome: Ongoing  Goal: Absence of physical injury  Description: Absence of physical injury  Outcome: Ongoing     Problem: Pain:  Goal: Pain level will decrease  Description: Pain level will decrease  Outcome: Ongoing  Goal: Control of acute pain  Description: Control of acute pain  Outcome: Ongoing  Goal: Control of chronic pain  Description: Control of chronic pain  Outcome: Ongoing  Goal: Patient's pain/discomfort is manageable  Description: Patient's pain/discomfort is manageable  Outcome: Ongoing     Problem: Infection:  Goal: Will remain free from infection  Description: Will remain free from infection  Outcome: Ongoing     Problem: Safety:  Goal: Free from accidental physical injury  Description: Free from accidental physical injury  Outcome: Ongoing  Goal: Free from intentional harm  Description: Free from intentional harm  Outcome: Ongoing     Problem: Daily Care:  Goal: Daily care needs are met  Description: Daily care needs are met  Outcome: Ongoing     Problem: Discharge Planning:  Goal: Patients continuum of care needs are met  Description: Patients continuum of care needs are met  Outcome: Ongoing     Problem: Daily Care:  Goal: Daily care needs are met  Description: Daily care needs are met  Outcome: Ongoing     Problem: Discharge Planning:  Goal: Patients continuum of care needs are met  Description: Patients continuum of care needs are met  Outcome: Ongoing     Problem: Musculor/Skeletal Functional Status  Goal: Highest potential functional level  Outcome: Ongoing  Goal: Absence of falls  Outcome: Ongoing

## 2021-05-31 PROCEDURE — 97110 THERAPEUTIC EXERCISES: CPT

## 2021-05-31 PROCEDURE — 99232 SBSQ HOSP IP/OBS MODERATE 35: CPT | Performed by: INTERNAL MEDICINE

## 2021-05-31 PROCEDURE — 97116 GAIT TRAINING THERAPY: CPT

## 2021-05-31 PROCEDURE — 6370000000 HC RX 637 (ALT 250 FOR IP): Performed by: INTERNAL MEDICINE

## 2021-05-31 PROCEDURE — 99232 SBSQ HOSP IP/OBS MODERATE 35: CPT | Performed by: ORTHOPAEDIC SURGERY

## 2021-05-31 PROCEDURE — 6360000002 HC RX W HCPCS: Performed by: INTERNAL MEDICINE

## 2021-05-31 PROCEDURE — 1200000000 HC SEMI PRIVATE

## 2021-05-31 RX ADMIN — ANTACID TABLETS 500 MG: 500 TABLET, CHEWABLE ORAL at 19:24

## 2021-05-31 RX ADMIN — LEVOTHYROXINE SODIUM 50 MCG: 0.05 TABLET ORAL at 07:57

## 2021-05-31 RX ADMIN — OXYCODONE HYDROCHLORIDE AND ACETAMINOPHEN 500 MG: 500 TABLET ORAL at 07:57

## 2021-05-31 RX ADMIN — OXYCODONE HYDROCHLORIDE AND ACETAMINOPHEN 500 MG: 500 TABLET ORAL at 19:24

## 2021-05-31 RX ADMIN — ANTACID TABLETS 500 MG: 500 TABLET, CHEWABLE ORAL at 07:57

## 2021-05-31 RX ADMIN — Medication 400 UNITS: at 07:57

## 2021-05-31 ASSESSMENT — PAIN DESCRIPTION - PAIN TYPE: TYPE: ACUTE PAIN

## 2021-05-31 ASSESSMENT — PAIN SCALES - GENERAL: PAINLEVEL_OUTOF10: 3

## 2021-05-31 ASSESSMENT — PAIN DESCRIPTION - LOCATION: LOCATION: ELBOW;SHOULDER

## 2021-05-31 ASSESSMENT — PAIN DESCRIPTION - ORIENTATION: ORIENTATION: RIGHT

## 2021-05-31 ASSESSMENT — PAIN DESCRIPTION - FREQUENCY: FREQUENCY: CONTINUOUS

## 2021-05-31 ASSESSMENT — PAIN DESCRIPTION - DESCRIPTORS: DESCRIPTORS: ACHING;DULL

## 2021-05-31 NOTE — PLAN OF CARE
Problem: Falls - Risk of:  Goal: Will remain free from falls  Description: Will remain free from falls  5/31/2021 1718 by Eileen Baron RN  Outcome: Ongoing  5/31/2021 0418 by Zakia Bradley RN  Outcome: Ongoing  Goal: Absence of physical injury  Description: Absence of physical injury  5/31/2021 1718 by Eileen Baron RN  Outcome: Ongoing  5/31/2021 0418 by Zakia Bradley RN  Outcome: Ongoing     Problem: Pain:  Goal: Pain level will decrease  Description: Pain level will decrease  5/31/2021 1718 by Eileen Baron RN  Outcome: Ongoing  5/31/2021 0418 by Zakia Bradley RN  Outcome: Ongoing  Goal: Control of acute pain  Description: Control of acute pain  5/31/2021 1718 by Eileen Baron RN  Outcome: Ongoing  5/31/2021 0418 by Zakia Bradley RN  Outcome: Ongoing  Goal: Control of chronic pain  Description: Control of chronic pain  5/31/2021 1718 by Eileen Baron RN  Outcome: Ongoing  5/31/2021 0418 by Zakia Bradley RN  Outcome: Ongoing  Goal: Patient's pain/discomfort is manageable  Description: Patient's pain/discomfort is manageable  5/31/2021 1718 by Eileen Baron RN  Outcome: Ongoing  5/31/2021 0418 by Zakia Bradley RN  Outcome: Ongoing     Problem: Infection:  Goal: Will remain free from infection  Description: Will remain free from infection  5/31/2021 1718 by Eileen Baron RN  Outcome: Ongoing  5/31/2021 0418 by Zakia Bradley RN  Outcome: Ongoing     Problem: Safety:  Goal: Free from accidental physical injury  Description: Free from accidental physical injury  5/31/2021 1718 by Eileen Baron RN  Outcome: Ongoing  5/31/2021 0418 by Zakia Bradley RN  Outcome: Ongoing  Goal: Free from intentional harm  Description: Free from intentional harm  5/31/2021 1718 by Eileen Baron RN  Outcome: Ongoing  5/31/2021 0418 by Zakia Bradley RN  Outcome: Ongoing     Problem: Daily Care:  Goal: Daily care needs are met  Description: Daily care needs are met  5/31/2021 1718 by Phineas Loraine, RN  Outcome: Ongoing  5/31/2021 4645 by Ross Booker RN  Outcome: Ongoing     Problem: Discharge Planning:  Goal: Patients continuum of care needs are met  Description: Patients continuum of care needs are met  5/31/2021 1718 by Glenda Bolden RN  Outcome: Ongoing  5/31/2021 0418 by Ross Booker RN  Outcome: Ongoing     Problem: Musculor/Skeletal Functional Status  Goal: Highest potential functional level  5/31/2021 1718 by Glenda Bolden RN  Outcome: Ongoing  5/31/2021 0418 by Ross Booker RN  Outcome: Ongoing  Goal: Absence of falls  5/31/2021 1718 by Glenda Bolden RN  Outcome: Ongoing  5/31/2021 0418 by Ross Booker RN  Outcome: Ongoing

## 2021-05-31 NOTE — PROGRESS NOTES
Formerly [facility], [facility] Internal Medicine    CONSULTATION / HISTORY AND PHYSICAL EXAMINATION            Date:   5/31/2021  Patient name:  Andrea Currie  Date of admission:  5/29/2021 10:35 AM  MRN:   [id number]  Account:  [de-identified]  YOB: 1940  PCP:    No primary care provider on file. Room:   2071/2071-01  Code Status:    Full Code    Physician Requesting Consult: Jorge Patel MD    Reason for Consult:  medical management    Chief Complaint:     Chief Complaint   Patient presents with   [facility]     with right arm pain, pt states did not hit head. History Obtained From:     Patient medical record nursing staff    History of Present Illness:   Patient past medical history multiple medical problems from hypothyroidism, hypertension, mild degree of aortic stenosis, patient had fractured her left foot after a jar  fell on her left foot in February, she was using a cane and walker, ever since, yesterday, she went to bathroom, she fell asleep on the toilet seat, when she tried to get up from the toilet seat, she lost balance, fell, injured her right arm, she noticed pain, swelling in right arm that make her come to the emergency room, she was found to have displaced fracture of right humerus,  In the emergency room, sling was applied to right humerus, patient as of now, is pain-free,     Past Medical History:     Past Medical History:   Diagnosis Date    Hypertension     Hypothyroidism     Osteoporosis     Uterovaginal prolapse         Past Surgical History:     Past Surgical History:   Procedure Laterality Date    ELBOW SURGERY  2010    TONSILLECTOMY      TUBAL LIGATION      WRIST SURGERY  1996        Medications Prior to Admission:     Prior to Admission medications    Medication Sig Start Date End Date Taking?  Authorizing Provider   Misc Natural Products (LUTEIN 20) CAPS Take by mouth   Yes Historical Provider, MD   calcium carbonate 600 MG TABS tablet Take 1 tablet by mouth 2 times daily. Yes Historical Provider, MD   levothyroxine (LEVOTHROID) 50 MCG tablet Take 50 mcg by mouth daily. Takes 50 mcg 5 days a week  Then 100 mcg sat and sun. Yes Historical Provider, MD   Vitamins-Lipotropics (B-100 PO) Take 1 tablet by mouth. Yes Historical Provider, MD   Multiple Vitamins-Iron (MULTI-VITAMIN/IRON PO) Take 1 tablet by mouth daily. Yes Historical Provider, MD   Ascorbic Acid (VITAMIN C) 500 MG tablet Take 500 mg by mouth 2 times daily. Yes Historical Provider, MD   vitamin E 400 UNIT capsule Take 400 Units by mouth daily    Yes Historical Provider, MD        Allergies:     Latex, Morphine, Alendronate sodium, Ceclor [cefaclor], Codeine, Daypro [oxaprozin], Seldane [terfenadine], and Ultram [tramadol hcl]    Social History:     Tobacco:    reports that she quit smoking about 51 years ago. She has a 4.00 pack-year smoking history. She has never used smokeless tobacco.  Alcohol:      reports no history of alcohol use. Drug Use:  reports no history of drug use. Family History:     History reviewed. No pertinent family history. Review of Systems:     Positive and Negative as described in HPI. CONSTITUTIONAL:  negative for fevers, chills, sweats, fatigue, weight loss  HEENT:  negative for vision, hearing changes, runny nose, throat pain  RESPIRATORY:  negative for shortness of breath, cough, congestion, wheezing. CARDIOVASCULAR:  negative for chest pain, palpitations.   GASTROINTESTINAL:  negative for nausea, vomiting, diarrhea, constipation, change in bowel habits, abdominal pain   GENITOURINARY:  negative for difficulty of urination, burning with urination, frequency   INTEGUMENT:  negative for rash, skin lesions, easy bruising   HEMATOLOGIC/LYMPHATIC:  negative for swelling/edema   ALLERGIC/IMMUNOLOGIC:  negative for urticaria , itching  ENDOCRINE:  negative increase in drinking, increase in urination, hot or cold intolerance  MUSCULOSKELETAL:  Pain in Right Arm   NEUROLOGICAL:  negative for headaches, dizziness, lightheadedness, numbness, pain, tingling extremities  BEHAVIOR/PSYCH:      Physical Exam:     BP (!) 110/54   Pulse 83   Temp 98.1 °F (36.7 °C)   Resp 16   Ht 5' 2\" (1.575 m)   Wt 110 lb (49.9 kg)   SpO2 98%   BMI 20.12 kg/m²   Temp (24hrs), Av.2 °F (36.8 °C), Min:97.5 °F (36.4 °C), Max:99 °F (37.2 °C)    No results for input(s): POCGLU in the last 72 hours. Intake/Output Summary (Last 24 hours) at 2021 1526  Last data filed at 2021 0805  Gross per 24 hour   Intake --   Output 400 ml   Net -400 ml       General Appearance:  alert, well appearing, and in no acute distress  Mental status: oriented to person, place, and time with normal affect  Head:  normocephalic, atraumatic. Eye: no icterus, redness, pupils equal and reactive, extraocular eye movements intact, conjunctiva clear  Ear: normal external ear, no discharge, hearing intact  Nose:  no drainage noted  Mouth: mucous membranes moist  Neck: supple, no carotid bruits, thyroid not palpable  Lungs: Bilateral equal air entry, clear to ausculation, no wheezing, rales or rhonchi, normal effort  Cardiovascular: normal rate, regular rhythm, systolic murmur in aortic area, gallop, rub. Abdomen: Soft, nontender, nondistended, normal bowel sounds, no hepatomegaly or splenomegaly  Neurologic: There are no new focal motor or sensory deficits, normal muscle tone and bulk, no abnormal sensation, normal speech, cranial nerves II through XII grossly intact  Skin: No gross lesions, rashes, bruising or bleeding on exposed skin area  Extremities:  Right arm in sling , tenderness left foot  Psych: Investigations:      Laboratory Testing:  No results found for this or any previous visit (from the past 24 hour(s)).         Consultations:   IP CONSULT TO INTERNAL MEDICINE  IP CONSULT TO ORTHOPEDIC SURGERY  IP CONSULT TO SOCIAL WORK  Assessment :      Primary Problem  <principal problem not specified>    Active Hospital Problems    Diagnosis Date Noted    Nonrheumatic aortic valve stenosis [I35.0] 05/30/2021    Decreased ambulation status [Z74.09] 05/30/2021    Fracture of humeral head, closed, right, initial encounter Jessica Garduno 05/29/2021    Hypertension [I10]     Hypothyroidism [E03.9]      Active Problems:    Hypertension    Hypothyroidism    Fracture of humeral head, closed, right, initial encounter    Nonrheumatic aortic valve stenosis    Decreased ambulation status  Resolved Problems:    * No resolved hospital problems. *      Plan:     Right sided displaced humerus fracture, orthopedic surgery consulted, right arm in sling  Patient has mild degree of arctic stenosis, no echocardiogram in the system, patient mention she has seen her cardiologist 6 years ago, patient before her foot fracture was very active, able to walk 1 block easily  Hypertension, patient does not want to be on any medication for blood pressure control, understand the risks  On DVT prophylaxis Lovenox  PT/OT consulted   consulted for placement, although patient is reluctant to go to nursing home, if no surgical intervention planned by orthopedic surgery (she appears reluctant to undergo surgical procedure as well,), and patient does not want to go to nursing facility will work on discharge planning  If there will be, plan for surgical intervention, in that case, patient need to undergo echocardiogram to look for degree of aortic stenosis since patient did not have any work-up in last 6 years    5/31  Awaiting placement  Not a surgical candidate. Darren Ellsworth MD  5/31/2021  3:26 PM    Copy sent to Dr. Mejía primary care provider on file. Please note that this chart was generated using voice recognition Dragon dictation software. Although every effort was made to ensure the accuracy of this automated transcription, some errors in transcription may have occurred.

## 2021-05-31 NOTE — CARE COORDINATION
ONGOING DISCHARGE PLAN:    Patient is alert and oriented x4. Spoke with patient regarding discharge plan and patient confirms that she wants to go to SNF [address] This is in line with therapy recommendations. Will continue to follow for additional discharge needs.     Electronically signed by Rose Pacheco RN on 5/31/2021 at 1:26 PM

## 2021-05-31 NOTE — PLAN OF CARE
Problem: Falls - Risk of:  Goal: Will remain free from falls  Description: Will remain free from falls  5/31/2021 0418 by Su Hernandez RN  Outcome: Ongoing     Problem: Falls - Risk of:  Goal: Absence of physical injury  Description: Absence of physical injury  5/31/2021 0418 by Su Hernandez RN  Outcome: Ongoing     Problem: Pain:  Goal: Pain level will decrease  Description: Pain level will decrease  5/31/2021 0418 by Su Hernandez RN  Outcome: Ongoing     Problem: Pain:  Goal: Control of acute pain  Description: Control of acute pain  5/31/2021 0418 by Su Hernandez RN  Outcome: Ongoing     Problem: Pain:  Goal: Control of chronic pain  Description: Control of chronic pain  5/31/2021 0418 by Su Hernandez RN  Outcome: Ongoing     Problem: Pain:  Goal: Patient's pain/discomfort is manageable  Description: Patient's pain/discomfort is manageable  5/31/2021 0418 by Su Hernandez RN  Outcome: Ongoing  5/30/2021 1813 by David Perez RN  Outcome: Ongoing     Problem: Pain:  Goal: Patient's pain/discomfort is manageable  Description: Patient's pain/discomfort is manageable  5/31/2021 0418 by Su Hernandez RN  Outcome: Ongoing     Problem: Infection:  Goal: Will remain free from infection  Description: Will remain free from infection  5/31/2021 0418 by Su Hernandez RN  Outcome: Ongoing     Problem: Safety:  Goal: Free from accidental physical injury  Description: Free from accidental physical injury  5/31/2021 0418 by Su Hernandez RN  Outcome: Ongoing     Problem: Safety:  Goal: Free from intentional harm  Description: Free from intentional harm  5/31/2021 0418 by Su Hernandez RN  Outcome: Ongoing

## 2021-05-31 NOTE — PROGRESS NOTES
Surgical Progress Note    POD:      Patient doing fairly well  Vitals:    21 0629   BP: 133/67   Pulse: 74   Resp: 16   Temp: 97.5 °F (36.4 °C)   SpO2: 98%      Temp (24hrs), Av.1 °F (36.7 °C), Min:97.5 °F (36.4 °C), Max:99 °F (37.2 °C)       Pain Control fair  No unusual nausea    Exam: no change PT recommending Rehab        Lungs:  No respiratory distress    Labs reviewed:  Labs:  WBC/Hgb/Hct/Plts:  11.3/12.4/36.9/172 (1549)  BUN/Cr/glu/ALT/AST/amyl/lip:  17/0.53/--/--/--/--/-- (1549)     Na/K/Cl/CO2:  143/3.7/104/26 (1549)    I/O last 3 completed shifts:  In: -   Out: 800 [Urine:800]    Assessment:    Patient Active Problem List   Diagnosis    Hypertension    Hypothyroidism    Hyperlipemia    Allergic rhinitis    Fracture of humeral head, closed, right, initial encounter    Nonrheumatic aortic valve stenosis    Decreased ambulation status       Plan:  See my orders  Fu out pt   Ok to dc to Albania Gonzalez MD MD  2021 12:01 PM

## 2021-05-31 NOTE — DISCHARGE SUMMARY
Discharge Summary    Attending Physician: Darren Ellsworth MD  Admit Date: 5/29/2021  Discharge Date:    Primary Care Physician: No primary care provider on file. Admitting Diagnosis:  Active Problems:    Hypertension    Hypothyroidism    Fracture of humeral head, closed, right, initial encounter    Nonrheumatic aortic valve stenosis    Decreased ambulation status  Resolved Problems:    * No resolved hospital problems. *        Discharge Diagnoses: Active Problems:    Hypertension    Hypothyroidism    Fracture of humeral head, closed, right, initial encounter    Nonrheumatic aortic valve stenosis    Decreased ambulation status  Resolved Problems:    * No resolved hospital problems.  *         Past Medical History:   Diagnosis Date    Hypertension     Hypothyroidism     Osteoporosis     Uterovaginal prolapse        Procedures Performed and Findings  * No surgery found *     Consultations Obtained  IP CONSULT TO INTERNAL MEDICINE  IP CONSULT TO ORTHOPEDIC SURGERY  IP CONSULT TO [address] [place] [address] Dr Portillo  uncomplicated    Discharge Medications       Medication List      CONTINUE taking these medications    B-100 PO     calcium carbonate 600 MG Tabs tablet     Levothroid 50 MCG tablet  Generic drug: levothyroxine     Lutein 20 Caps     MULTI-VITAMIN/IRON PO     vitamin C 500 MG tablet  Commonly known as: ASCORBIC ACID     vitamin E 400 UNIT capsule             Discharge Condition  Stable       Activity on Discharge  As tolerated       Discharge Disposition:  [place]    Discharge Instructions  See Orders    Follow-Up Scheduled    Susy Nando [address] [facility]  [address] [id number]  [id number] [place]      Electronically signed by Jeremie Chadwick MD on 5/31/2021 at 12:03 PM

## 2021-05-31 NOTE — PROGRESS NOTES
Physical Therapy  [address] [facility]    Physical Therapy Progress Note    Date: 21  Patient Name: Frederica Leyden       Room:   MRN: [id number]   Account: [de-identified]   : 1940  (78 y.o.)   Gender: female     Discharge Recommendations   Patient would benefit from continued therapy after discharge  Equipment Needed: No    Referring Practitioner: Dr Haleigh Young  Diagnosis: Fall with Proximal Right Humerus fracture  Restrictions/Precautions: Fall Risk  Other position/activity restrictions: Right Upper Extremity  non-weight-bearing, no rotation shoulder  Right Upper Extremity Weight Bearing: Non Weight Bearing  Right Upper Extremity Brace/Splint: Arm sling with trunk strap to hold arm close to body due to proximal humerus fracture:  Sling straps were adjusted for fit and modified with a pull tab to assist patient with donning/doffing sling  Required Braces or Orthoses  Right Upper Extremity Brace/Splint: Sling  Right Upper Extremity Brace/Splint: Arm sling with trunk strap to hold arm close to body due to proximal humerus fracture:  Sling straps were adjusted for fit and modified with a pull tab to assist patient with donning/doffing sling   Past Medical History:  has a past medical history of Hypertension, Hypothyroidism, Osteoporosis, and Uterovaginal prolapse. Past Surgical History:   has a past surgical history that includes Elbow surgery (); Tubal ligation; Tonsillectomy; and Wrist surgery (). Restrictions/Precautions  Restrictions/Precautions: Fall Risk  Required Braces or Orthoses?: Yes (sling right arm, peripheral IV right proximal forearm)  Upper Extremity Weight Bearing Restrictions  Right Upper Extremity Weight Bearing: Non Weight Bearing  Required Braces or Orthoses  Right Upper Extremity Brace/Splint: Sling    Subjective: pt reports that she fell while standing up from the toilet.   Pt reports that she had fallen asleep on the commode and her legs were numb when she attempted to stand up and fell. Pt reports she fell onto the right arm striking the walk in shower. Pt reports hx of recent left 3rd metatarsal head fracture. Comments: nursing approves treatment     Vital Signs  Patient Currently in Pain: Yes  Pain Assessment: 0-10  Pain Level: 3  Patient's Stated Pain Goal: No pain  Pain Type: Acute pain  Pain Location: Elbow; Shoulder  Pain Orientation: Right  Pain Descriptors: Aching;Dull  Pain Frequency: Continuous  Non-Pharmaceutical Pain Intervention(s): Ambulation/Increased Activity     Bed Mobility:        Transfers:  Sit to Stand: Contact guard assistance  Stand to sit: Contact guard assistance      Ambulation 1  Surface: level tile  Device: Single point cane  Assistance: Contact guard assistance  Gait Deviations: Slow Jeanne  Distance: 70 ft x 2  Comments: sling right arm, hx recent left 3rd metatarsal head fracture (wearing padded slipper)        Stairs/Curb  Stairs?: No (level entry apartment)       Sitting - Static: Good  Sitting - Dynamic: Good  Standing - Static: Good;- (used s cane)  Standing - Dynamic: Fair (used s cane)     Other exercises?: Yes  Other exercises 1: seated bilateral LE x 15           Activity Tolerance: Patient limited by fatigue;Patient limited by pain; Patient limited by endurance  PT Equipment Recommendations  Equipment Needed: No       Assessment  Activity Tolerance: Patient limited by fatigue;Patient limited by pain; Patient limited by endurance   Body structures, Functions, Activity limitations: Decreased functional mobility ; Decreased endurance;Decreased balance; Increased pain;Decreased safe awareness;Decreased strength  Prognosis: Good  Discharge Recommendations: Patient would benefit from continued therapy after discharge     Type of devices: All fall risk precautions in place;Call light within reach;Gait belt;Patient at risk for falls; Left in chair     Plan  Times per week: 5-7 treatments/ week  Times per day:  (5-7 treatments/ week)  Current Treatment Recommendations: Strengthening, Safety Education & Training, Balance Training, Endurance Training, Patient/Caregiver Education & Training, Equipment Evaluation, Education, & procurement, Functional Mobility Training, Transfer Training, Gait Training    Patient Education  New Education Provided:    Learner:patient  Method: explanation       Outcome: needs reinforcement     Goals  Short term goals  Time Frame for Short term goals: 5-7 treatments/ week  Short term goal 1: pt to tolerate 1/2 hour of therapuetic exercise and activity  Short term goal 2: pt to demonstrate good technique for balance activities and general LE strengthening exercises  Short term goal 3: pt to demonstrate rolling in bed to the left w/ SBA x 1 using the rail to reposition herself  Short term goal 4: pt to demonstrate transfers supine <> sit w/ min x 1 and sit <> stand & bed <> chair w/ CGA x 1 using s cane while wearing sling right arm  Short term goal 5: pt to demonstrate gait 50-80' using s cane w/ CGA x 1 while wearing sling right arm  Short term goal 6: pt to demonstrate good (-) or better ambulatory balance using s cane  while wearing sling right arm    PT Individual Minutes  Time In: (P) 0908  Time Out: (P) 5210  Minutes: (P) 29    Electronically signed by Gopi Wilkinson PTA on 5/31/21 at 12:14 PM EDT         05/31/21 1213   PT Individual Minutes   Time In 0908   Time Out [address]   Minutes 29

## 2021-06-01 PROCEDURE — 97116 GAIT TRAINING THERAPY: CPT

## 2021-06-01 PROCEDURE — 99232 SBSQ HOSP IP/OBS MODERATE 35: CPT | Performed by: INTERNAL MEDICINE

## 2021-06-01 PROCEDURE — 97110 THERAPEUTIC EXERCISES: CPT

## 2021-06-01 PROCEDURE — 1200000000 HC SEMI PRIVATE

## 2021-06-01 PROCEDURE — 6370000000 HC RX 637 (ALT 250 FOR IP): Performed by: INTERNAL MEDICINE

## 2021-06-01 RX ADMIN — Medication 400 UNITS: at 07:49

## 2021-06-01 RX ADMIN — ANTACID TABLETS 500 MG: 500 TABLET, CHEWABLE ORAL at 07:49

## 2021-06-01 RX ADMIN — ANTACID TABLETS 500 MG: 500 TABLET, CHEWABLE ORAL at 22:12

## 2021-06-01 RX ADMIN — OXYCODONE HYDROCHLORIDE AND ACETAMINOPHEN 500 MG: 500 TABLET ORAL at 07:49

## 2021-06-01 RX ADMIN — OXYCODONE HYDROCHLORIDE AND ACETAMINOPHEN 500 MG: 500 TABLET ORAL at 22:12

## 2021-06-01 RX ADMIN — LEVOTHYROXINE SODIUM 50 MCG: 0.05 TABLET ORAL at 06:33

## 2021-06-01 ASSESSMENT — PAIN SCALES - GENERAL: PAINLEVEL_OUTOF10: 3

## 2021-06-01 ASSESSMENT — PAIN DESCRIPTION - ORIENTATION: ORIENTATION: RIGHT

## 2021-06-01 ASSESSMENT — PAIN DESCRIPTION - PROGRESSION: CLINICAL_PROGRESSION: GRADUALLY IMPROVING

## 2021-06-01 ASSESSMENT — PAIN DESCRIPTION - LOCATION: LOCATION: HAND

## 2021-06-01 ASSESSMENT — PAIN DESCRIPTION - FREQUENCY: FREQUENCY: CONTINUOUS

## 2021-06-01 ASSESSMENT — PAIN DESCRIPTION - ONSET: ONSET: ON-GOING

## 2021-06-01 ASSESSMENT — PAIN DESCRIPTION - PAIN TYPE: TYPE: ACUTE PAIN

## 2021-06-01 NOTE — PLAN OF CARE
Problem: Falls - Risk of:  Goal: Will remain free from falls  Description: Will remain free from falls  6/1/2021 1806 by Janet Ortega RN  Outcome: Ongoing  Note: Patient remains free of falls and injuries throughout shift. Bed remains in the lowest position, wheels locked, call light and bedside table are within reach. 6/1/2021 0437 by Adri Linares RN  Outcome: Ongoing  Note: Patient remains free of incidence/ injury. Bed remains in low position. Pt uses call light appropriately. Goal: Absence of physical injury  Description: Absence of physical injury  Outcome: Ongoing     Problem: Pain:  Goal: Pain level will decrease  Description: Pain level will decrease  Outcome: Ongoing  Note: Patient's pain is well controlled with current regimen. See MAR. Goal: Control of acute pain  Description: Control of acute pain  6/1/2021 1806 by Janet Ortega RN  Outcome: Ongoing  6/1/2021 0437 by Adri Linares RN  Outcome: Ongoing  Note: Patient expresses relief following administration of prn pain medication. Goal: Control of chronic pain  Description: Control of chronic pain  Outcome: Ongoing  Goal: Patient's pain/discomfort is manageable  Description: Patient's pain/discomfort is manageable  Outcome: Ongoing     Problem: Infection:  Goal: Will remain free from infection  Description: Will remain free from infection  6/1/2021 1806 by Janet Ortega RN  Outcome: Ongoing  6/1/2021 0437 by Adri Linares RN  Outcome: Ongoing  Note: Patient displays no new signs of infection during this shift.        Problem: Safety:  Goal: Free from accidental physical injury  Description: Free from accidental physical injury  Outcome: Ongoing  Goal: Free from intentional harm  Description: Free from intentional harm  Outcome: Ongoing     Problem: Daily Care:  Goal: Daily care needs are met  Description: Daily care needs are met  Outcome: Ongoing     Problem: Discharge Planning:  Goal: Patients continuum of care needs are met  Description: Patients continuum of care needs are met  Outcome: Ongoing     Problem: Musculor/Skeletal Functional Status  Goal: Highest potential functional level  Outcome: Ongoing  Goal: Absence of falls  Outcome: Ongoing

## 2021-06-01 NOTE — PROGRESS NOTES
ANAID sent referral to [facility] and spoke to Wale Fernandes. They will start pre-cert today. Addendum; insurance denied SNF request. Ned Heath is scheduled with Dr. Jaida John tomorrow at 21 [phone].

## 2021-06-01 NOTE — PROGRESS NOTES
Physical Therapy  Facility/Department: [facility] MED SURG  Daily Treatment Note  NAME: Kassi Bales  : 1940  MRN: [id number]    Date of Service: 2021    Discharge Recommendations:  Patient would benefit from continued therapy after discharge   PT Equipment Recommendations  Equipment Needed: No    Assessment   Body structures, Functions, Activity limitations: Decreased functional mobility ; Decreased endurance;Decreased balance; Increased pain;Decreased safe awareness;Decreased strength  Assessment: pt is a fall risk. Pt needing significant amount of assist to stand from chair or get in & out of bed  due to right humeral fracture. Pt currently wearing a sling to right UE which is her dominant arm. Pt is unsafe to return home independently to her apartment due to high fall risk. Pt would benefit from further skilled care. Treatment Diagnosis: impaired mobility and balance S/P right humeral head fracture  Specific instructions for Next Treatment: advance gait distance using s cane, instruct in exercise; FALL RISK  Prognosis: Good  Decision Making: Medium Complexity  History: admitted following a fall at home in the bathroom  Exam: ROM, MMT, balance and mobility assessments  Clinical Presentation: gait w/ s cane 70' x 2 w/ min x 1, LOB w/ assist to correct, FALL RISK, min x 1 for sit <> stand and mod x 1 for supine <> sit, must wear sling right arm, FALL RISK  PT Education: Goals; General Safety;Gait Training;PT Role;Plan of Care;Transfer Training;Functional Mobility Training  Barriers to Learning: none  REQUIRES PT FOLLOW UP: Yes  Activity Tolerance  Activity Tolerance: Patient limited by fatigue;Patient limited by pain; Patient limited by endurance     Patient Diagnosis(es): The encounter diagnosis was Fracture of humeral head, closed, right, initial encounter.      has a past medical history of Hypertension, Hypothyroidism, Osteoporosis, and Uterovaginal prolapse.   has a past surgical history that includes Elbow surgery (2010); Tubal ligation; Tonsillectomy; and Wrist surgery (1996). Restrictions  Restrictions/Precautions  Restrictions/Precautions: Fall Risk  Required Braces or Orthoses?: Yes (sling right arm, peripheral IV right proximal forearm)  Upper Extremity Weight Bearing Restrictions  Right Upper Extremity Weight Bearing: Non Weight Bearing  Required Braces or Orthoses  Right Upper Extremity Brace/Splint: Sling  Right Upper Extremity Brace/Splint: Arm sling with trunk strap to hold arm close to body due to proximal humerus fracture:  Sling straps were adjusted for fit and modified with a pull tab to assist patient with donning/doffing sling  Position Activity Restriction  Other position/activity restrictions: Right Upper Extremity  non-weight-bearing, no rotation shoulder  Subjective   General  Response To Previous Treatment: Patient reporting fatigue but able to participate. Family / Caregiver Present: No  Referring Practitioner: Dr. Ricarda Corcoran: pt reports that she fell while standing up from the toilet. Pt reports that she had fallen asleep on the commode and her legs were numb when she attempted to stand up and fell. Pt reports she fell onto the right arm striking the walk in shower. Pt reports hx of recent left 3rd metatarsal head fracture.   Pain Screening  Patient Currently in Pain: Yes  Pain Assessment  Pain Assessment: 0-10  Pain Level: 3  Patient's Stated Pain Goal: No pain  Pain Type: Acute pain  Pain Location: Hand  Pain Orientation: Right  Pain Descriptors:  (\"stiffness\")  Pain Frequency: Continuous  Pain Onset: On-going  Clinical Progression: Gradually improving  Non-Pharmaceutical Pain Intervention(s): Ambulation/Increased Activity;Repositioned  Response to Pain Intervention: Patient Satisfied  Vital Signs  Patient Currently in Pain: Yes       Orientation  Orientation  Overall Orientation Status: Within Functional Limits (answered all questions correctly)  Cognition x 1 using s cane while wearing sling right arm  Short term goal 5: pt to demonstrate gait 50-80' using s cane w/ CGA x 1 while wearing sling right arm  Short term goal 6: pt to demonstrate good (-) or better ambulatory balance using s cane  while wearing sling right arm  Patient Goals   Patient goals : eventual return to her apartment    Plan    Plan  Times per week: 5-7 treatments/ week  Times per day:  (5-7 treatments/ week)  Specific instructions for Next Treatment: advance gait distance using s cane, instruct in exercise; FALL RISK  Current Treatment Recommendations: Strengthening, Safety Education & Training, Balance Training, Endurance Training, Patient/Caregiver Education & Training, Equipment Evaluation, Education, & procurement, Functional Mobility Training, Transfer Training, Gait Training  Safety Devices  Type of devices:  All fall risk precautions in place, Bed alarm in place, Call light within reach, Gait belt, Patient at risk for falls, Left in bed, Nurse notified (nurse Tommy Hunter)     Therapy Time   Individual Concurrent Group Co-treatment   Time In [address]         Time Out 1017         Minutes 25         Timed Code Treatment Minutes: [address], PT

## 2021-06-01 NOTE — PROGRESS NOTES
[facility] Internal Medicine    CONSULTATION / HISTORY AND PHYSICAL EXAMINATION            Date:   6/1/2021  Patient name:  Belgica Lopez  Date of admission:  5/29/2021 10:35 AM  MRN:   [id number]  Account:  [de-identified]  YOB: 1940  PCP:    No primary care provider on file. Room:   2071/2071-01  Code Status:    Full Code    Physician Requesting Consult: Carla Pyle MD    Reason for Consult:  medical management    Chief Complaint:     Chief Complaint   Patient presents with   [facility]     with right arm pain, pt states did not hit head. History Obtained From:     Patient medical record nursing staff    History of Present Illness:   Patient past medical history multiple medical problems from hypothyroidism, hypertension, mild degree of aortic stenosis, patient had fractured her left foot after a jar  fell on her left foot in February, she was using a cane and walker, ever since, yesterday, she went to bathroom, she fell asleep on the toilet seat, when she tried to get up from the toilet seat, she lost balance, fell, injured her right arm, she noticed pain, swelling in right arm that make her come to the emergency room, she was found to have displaced fracture of right humerus,  In the emergency room, sling was applied to right humerus, patient as of now, is pain-free,     Past Medical History:     Past Medical History:   Diagnosis Date    Hypertension     Hypothyroidism     Osteoporosis     Uterovaginal prolapse         Past Surgical History:     Past Surgical History:   Procedure Laterality Date    ELBOW SURGERY  2010    TONSILLECTOMY      TUBAL LIGATION      WRIST SURGERY  1996        Medications Prior to Admission:     Prior to Admission medications    Medication Sig Start Date End Date Taking?  Authorizing Provider   Misc Natural Products (LUTEIN 20) CAPS Take by mouth   Yes Historical Provider, MD   calcium carbonate 600 MG TABS tablet Take 1 tablet by mouth 2 times daily. Yes Historical Provider, MD   levothyroxine (LEVOTHROID) 50 MCG tablet Take 50 mcg by mouth daily. Takes 50 mcg 5 days a week  Then 100 mcg sat and sun. Yes Historical Provider, MD   Vitamins-Lipotropics (B-100 PO) Take 1 tablet by mouth. Yes Historical Provider, MD   Multiple Vitamins-Iron (MULTI-VITAMIN/IRON PO) Take 1 tablet by mouth daily. Yes Historical Provider, MD   Ascorbic Acid (VITAMIN C) 500 MG tablet Take 500 mg by mouth 2 times daily. Yes Historical Provider, MD   vitamin E 400 UNIT capsule Take 400 Units by mouth daily    Yes Historical Provider, MD        Allergies:     Latex, Morphine, Alendronate sodium, Ceclor [cefaclor], Codeine, Daypro [oxaprozin], Seldane [terfenadine], and Ultram [tramadol hcl]    Social History:     Tobacco:    reports that she quit smoking about 51 years ago. She has a 4.00 pack-year smoking history. She has never used smokeless tobacco.  Alcohol:      reports no history of alcohol use. Drug Use:  reports no history of drug use. Family History:     History reviewed. No pertinent family history. Review of Systems:     Positive and Negative as described in HPI. CONSTITUTIONAL:  negative for fevers, chills, sweats, fatigue, weight loss  HEENT:  negative for vision, hearing changes, runny nose, throat pain  RESPIRATORY:  negative for shortness of breath, cough, congestion, wheezing. CARDIOVASCULAR:  negative for chest pain, palpitations.   GASTROINTESTINAL:  negative for nausea, vomiting, diarrhea, constipation, change in bowel habits, abdominal pain   GENITOURINARY:  negative for difficulty of urination, burning with urination, frequency   INTEGUMENT:  negative for rash, skin lesions, easy bruising   HEMATOLOGIC/LYMPHATIC:  negative for swelling/edema   ALLERGIC/IMMUNOLOGIC:  negative for urticaria , itching  ENDOCRINE:  negative increase in drinking, increase in urination, hot or cold intolerance  MUSCULOSKELETAL:  Pain in Right Arm   NEUROLOGICAL:  negative for headaches, dizziness, lightheadedness, numbness, pain, tingling extremities  BEHAVIOR/PSYCH:      Physical Exam:     BP (!) 105/55   Pulse 82   Temp 98.1 °F (36.7 °C) (Oral)   Resp 16   Ht 5' 2\" (1.575 m)   Wt 110 lb (49.9 kg)   SpO2 95%   BMI 20.12 kg/m²   Temp (24hrs), Av.4 °F (36.9 °C), Min:98.1 °F (36.7 °C), Max:99.2 °F (37.3 °C)    No results for input(s): POCGLU in the last 72 hours. Intake/Output Summary (Last 24 hours) at 2021 1357  Last data filed at 2021 0715  Gross per 24 hour   Intake --   Output 1750 ml   Net -1750 ml       General Appearance:  alert, well appearing, and in no acute distress  Mental status: oriented to person, place, and time with normal affect  Head:  normocephalic, atraumatic. Eye: no icterus, redness, pupils equal and reactive, extraocular eye movements intact, conjunctiva clear  Ear: normal external ear, no discharge, hearing intact  Nose:  no drainage noted  Mouth: mucous membranes moist  Neck: supple, no carotid bruits, thyroid not palpable  Lungs: Bilateral equal air entry, clear to ausculation, no wheezing, rales or rhonchi, normal effort  Cardiovascular: normal rate, regular rhythm, systolic murmur in aortic area, gallop, rub. Abdomen: Soft, nontender, nondistended, normal bowel sounds, no hepatomegaly or splenomegaly  Neurologic: There are no new focal motor or sensory deficits, normal muscle tone and bulk, no abnormal sensation, normal speech, cranial nerves II through XII grossly intact  Skin: No gross lesions, rashes, bruising or bleeding on exposed skin area  Extremities:  Right arm in sling , tenderness left foot  Psych: Investigations:      Laboratory Testing:  No results found for this or any previous visit (from the past 24 hour(s)).         Consultations:   IP CONSULT TO INTERNAL MEDICINE  IP CONSULT TO ORTHOPEDIC SURGERY  IP CONSULT TO SOCIAL WORK  Assessment :      Primary Problem  <principal problem not specified>    Active Hospital Problems    Diagnosis Date Noted    Nonrheumatic aortic valve stenosis [I35.0] 05/30/2021    Decreased ambulation status [Z74.09] 05/30/2021    Fracture of humeral head, closed, right, initial encounter [facility] 05/29/2021    Hypertension [I10]     Hypothyroidism [E03.9]      Active Problems:    Hypertension    Hypothyroidism    Fracture of humeral head, closed, right, initial encounter    Nonrheumatic aortic valve stenosis    Decreased ambulation status  Resolved Problems:    * No resolved hospital problems. *      Plan:     Right sided displaced humerus fracture, orthopedic surgery consulted, right arm in sling  Patient has mild degree of arctic stenosis, no echocardiogram in the system, patient mention she has seen her cardiologist 6 years ago, patient before her foot fracture was very active, able to walk 1 block easily  Hypertension, patient does not want to be on any medication for blood pressure control, understand the risks  On DVT prophylaxis Lovenox  PT/OT consulted   consulted for placement, although patient is reluctant to go to nursing home, if no surgical intervention planned by orthopedic surgery (she appears reluctant to undergo surgical procedure as well,), and patient does not want to go to nursing facility will work on discharge planning  If there will be, plan for surgical intervention, in that case, patient need to undergo echocardiogram to look for degree of aortic stenosis since patient did not have any work-up in last 6 years    5/31  Awaiting placement  Not a surgical candidate. 6/1  Discharge planning  Awaiting placement  Simba Peterson MD  6/1/2021  1:54 PM    Copy sent to Dr. Mejía primary care provider on file. Please note that this chart was generated using voice recognition Dragon dictation software.   Although every effort was made to ensure the accuracy of this automated transcription, some errors in transcription may have occurred.

## 2021-06-01 NOTE — PLAN OF CARE
Problem: Falls - Risk of:  Goal: Will remain free from falls  Description: Will remain free from falls  6/1/2021 0437 by Twila Andres RN  Outcome: Ongoing  Note: Patient remains free of incidence/ injury. Bed remains in low position. Pt uses call light appropriately. Problem: Pain:  Goal: Control of acute pain  Description: Control of acute pain  6/1/2021 0437 by Twila Andres RN  Outcome: Ongoing  Note: Patient expresses relief following administration of prn pain medication. Problem: Infection:  Goal: Will remain free from infection  Description: Will remain free from infection  6/1/2021 0437 by Twila Andres RN  Outcome: Ongoing  Note: Patient displays no new signs of infection during this shift.

## 2021-06-02 VITALS
HEART RATE: 88 BPM | RESPIRATION RATE: 16 BRPM | WEIGHT: 110 LBS | TEMPERATURE: 98.2 F | SYSTOLIC BLOOD PRESSURE: 118 MMHG | OXYGEN SATURATION: 97 % | HEIGHT: 62 IN | BODY MASS INDEX: 20.24 KG/M2 | DIASTOLIC BLOOD PRESSURE: 53 MMHG

## 2021-06-02 PROCEDURE — 6370000000 HC RX 637 (ALT 250 FOR IP): Performed by: INTERNAL MEDICINE

## 2021-06-02 PROCEDURE — 97530 THERAPEUTIC ACTIVITIES: CPT

## 2021-06-02 PROCEDURE — 99239 HOSP IP/OBS DSCHRG MGMT >30: CPT | Performed by: INTERNAL MEDICINE

## 2021-06-02 PROCEDURE — 97110 THERAPEUTIC EXERCISES: CPT

## 2021-06-02 PROCEDURE — 97116 GAIT TRAINING THERAPY: CPT

## 2021-06-02 PROCEDURE — 97535 SELF CARE MNGMENT TRAINING: CPT

## 2021-06-02 RX ADMIN — LEVOTHYROXINE SODIUM 50 MCG: 0.05 TABLET ORAL at 06:43

## 2021-06-02 RX ADMIN — Medication 400 UNITS: at 07:21

## 2021-06-02 RX ADMIN — OXYCODONE HYDROCHLORIDE AND ACETAMINOPHEN 500 MG: 500 TABLET ORAL at 07:20

## 2021-06-02 RX ADMIN — ANTACID TABLETS 500 MG: 500 TABLET, CHEWABLE ORAL at 07:19

## 2021-06-02 ASSESSMENT — PAIN DESCRIPTION - DESCRIPTORS: DESCRIPTORS: NUMBNESS

## 2021-06-02 ASSESSMENT — PAIN DESCRIPTION - ORIENTATION
ORIENTATION: RIGHT
ORIENTATION: RIGHT

## 2021-06-02 ASSESSMENT — PAIN DESCRIPTION - PAIN TYPE
TYPE: ACUTE PAIN
TYPE: ACUTE PAIN

## 2021-06-02 ASSESSMENT — PAIN DESCRIPTION - LOCATION
LOCATION: HAND
LOCATION: HAND

## 2021-06-02 ASSESSMENT — PAIN DESCRIPTION - FREQUENCY: FREQUENCY: CONTINUOUS

## 2021-06-02 ASSESSMENT — PAIN DESCRIPTION - PROGRESSION
CLINICAL_PROGRESSION: GRADUALLY IMPROVING
CLINICAL_PROGRESSION: GRADUALLY IMPROVING

## 2021-06-02 ASSESSMENT — PAIN SCALES - GENERAL
PAINLEVEL_OUTOF10: 2
PAINLEVEL_OUTOF10: 2

## 2021-06-02 ASSESSMENT — PAIN DESCRIPTION - ONSET: ONSET: ON-GOING

## 2021-06-02 NOTE — DISCHARGE SUMMARY
[facility] Internal Medicine    Discharge Summary     Patient ID: Libby Collins  :  1940   MRN: [id number]     ACCOUNT:  [de-identified]   Patient's PCP: No primary care provider on file. Admit Date: 2021   Discharge Date: 2021    Length of Stay: 4  Code Status:  Full Code  Admitting Physician: Steve Salas MD  Discharge Physician: Steve Salas MD     Active Discharge Diagnoses:     Primary Problem  <principal problem not specified>      [place] Problems    Diagnosis Date Noted    Nonrheumatic aortic valve stenosis [I35.0] 2021    Decreased ambulation status [Z74.09] 2021    Fracture of humeral head, closed, right, initial encounter [facility] 2021    Hypertension [I10]     Hypothyroidism [E03.9]        Admission Condition:  Poor      Discharged Condition: fair    Hospital Stay:     Hospital Course:   Libby Collins is a 78 y.o. female who was admitted for the management of <principal problem not specified> , presented to ER with Fall (with right arm pain, pt states did not hit head.)  Patient past medical history multiple medical problems from hypothyroidism, hypertension, mild degree of aortic stenosis, patient had fractured her left foot after a jar  fell on her left foot in February, she was using a cane and walker, ever since, yesterday, she went to bathroom, she fell asleep on the toilet seat, when she tried to get up from the toilet seat, she lost balance, fell, injured her right arm, she noticed pain, swelling in right arm that make her come to the emergency room, she was found to have displaced fracture of right humerus,  In the emergency room, sling was applied to right humerus, patient as of now, is pain-free, patient was urged by orthopedic surgery, managed conservatively  Patient getting discharged in a SNF        Significant therapeutic interventions:     Significant Diagnostic Studies:   Labs / Micro:        ,     Radiology:    XR SHOULDER RIGHT (MIN 2 VIEWS)    Result Date: 5/29/2021  EXAMINATION: TWO XRAY VIEWS OF THE RIGHT ELBOW; THREE XRAY VIEWS OF THE RIGHT SHOULDER; 2 XRAY VIEWS OF THE RIGHT WRIST 5/29/2021 12:28 pm; 5/29/2021 11:55 am; 5/29/2021 12:29 pm COMPARISON: None. HISTORY: ORDERING SYSTEM PROVIDED HISTORY: fall TECHNOLOGIST PROVIDED HISTORY: fall Reason for Exam: Pt states she fell onto her right arm today. Right shoulder to right wrist pain. Acuity: Unknown Type of Exam: Unknown; ORDERING SYSTEM PROVIDED HISTORY: fall, pain TECHNOLOGIST PROVIDED HISTORY: fall, pain Reason for Exam: Pt states she fell onto her right arm today. Right shoulder to right wrist pain. Acuity: Unknown Type of Exam: Unknown FINDINGS: Right shoulder: Displaced right proximal humerus fracture. Anatomic alignment. Degenerative changes in the [facility] joint and glenohumeral joint. Right elbow: Anatomic alignment. No fracture. Right wrist: Anatomic alignment. No fracture. Extensive degenerative changes. Displaced fracture proximal right humerus No other acute bony or joint abnormality     XR ELBOW RIGHT (2 VIEWS)    Result Date: 5/29/2021  EXAMINATION: TWO XRAY VIEWS OF THE RIGHT ELBOW; THREE XRAY VIEWS OF THE RIGHT SHOULDER; 2 XRAY VIEWS OF THE RIGHT WRIST 5/29/2021 12:28 pm; 5/29/2021 11:55 am; 5/29/2021 12:29 pm COMPARISON: None. HISTORY: ORDERING SYSTEM PROVIDED HISTORY: fall TECHNOLOGIST PROVIDED HISTORY: fall Reason for Exam: Pt states she fell onto her right arm today. Right shoulder to right wrist pain. Acuity: Unknown Type of Exam: Unknown; ORDERING SYSTEM PROVIDED HISTORY: fall, pain TECHNOLOGIST PROVIDED HISTORY: fall, pain Reason for Exam: Pt states she fell onto her right arm today. Right shoulder to right wrist pain. Acuity: Unknown Type of Exam: Unknown FINDINGS: Right shoulder: Displaced right proximal humerus fracture. Anatomic alignment. Degenerative changes in the [facility] joint and glenohumeral joint. Right elbow: Anatomic alignment. No fracture. Right wrist: Anatomic alignment. No fracture. Extensive degenerative changes. Displaced fracture proximal right humerus No other acute bony or joint abnormality     XR WRIST RIGHT (2 VIEWS)    Result Date: 5/29/2021  EXAMINATION: TWO XRAY VIEWS OF THE RIGHT ELBOW; THREE XRAY VIEWS OF THE RIGHT SHOULDER; 2 XRAY VIEWS OF THE RIGHT WRIST 5/29/2021 12:28 pm; 5/29/2021 11:55 am; 5/29/2021 12:29 pm COMPARISON: None. HISTORY: ORDERING SYSTEM PROVIDED HISTORY: fall TECHNOLOGIST PROVIDED HISTORY: fall Reason for Exam: Pt states she fell onto her right arm today. Right shoulder to right wrist pain. Acuity: Unknown Type of Exam: Unknown; ORDERING SYSTEM PROVIDED HISTORY: fall, pain TECHNOLOGIST PROVIDED HISTORY: fall, pain Reason for Exam: Pt states she fell onto her right arm today. Right shoulder to right wrist pain. Acuity: Unknown Type of Exam: Unknown FINDINGS: Right shoulder: Displaced right proximal humerus fracture. Anatomic alignment. Degenerative changes in the [facility] joint and glenohumeral joint. Right elbow: Anatomic alignment. No fracture. Right wrist: Anatomic alignment. No fracture. Extensive degenerative changes. Displaced fracture proximal right humerus No other acute bony or joint abnormality         Consultations:    Consults:     Final Specialist Recommendations/Findings:   IP CONSULT TO INTERNAL MEDICINE  IP CONSULT TO ORTHOPEDIC SURGERY  IP CONSULT TO SOCIAL WORK      The patient was seen and examined on day of discharge and this discharge summary is in conjunction with any daily progress note from day of discharge.     Discharge plan:     Disposition: Home    Physician Follow Up:     [address] [place] [address] [address]       Requiring Further Evaluation/Follow Up POST HOSPITALIZATION/Incidental Findings:    Diet: cardiac diet    Activity: As tolerated    Instructions to Patient: Discharge Medications:      Medication List      CONTINUE taking these medications    B-100 PO     calcium carbonate 600 MG Tabs tablet     Levothroid 50 MCG tablet  Generic drug: levothyroxine     Lutein 20 Caps     MULTI-VITAMIN/IRON PO     vitamin C 500 MG tablet  Commonly known as: ASCORBIC ACID     vitamin E 400 UNIT capsule            Time Spent on discharge is  35 mins in patient examination, evaluation, counseling as well as medication reconciliation, prescriptions for required medications, discharge plan and follow up. Electronically signed by   Chrissy Dominguez MD  6/2/2021  2:15 PM      Thank you Dr. Nicolle Stroud primary care provider on file. for the opportunity to be involved in this patient's care.

## 2021-06-02 NOTE — PROGRESS NOTES
[address]   INPATIENT OCCUPATIONAL THERAPY  PROGRESS NOTE  Date: 2021  Patient Name: Chetna Harrington      Room:   MRN: [id number]    : 1940  (78 y.o.) Gender: female     Discharge Recommendations:  Further Occupational Therapy is recommended upon facility discharge. OT Equipment Recommendations  Equipment Needed: Yes  Mobility Devices: ADL Assistive Devices  ADL Assistive Devices: Feeding Devices, Grab Bars - toilet  Other: Arm sling strapping was adjusted for fit and modified pull-tabs installed to increase her ability to self adjust / doff-homa arm sling  for care and comfort    Referring Practitioner: Dr Kenzie Samuels  Diagnosis: Fall with Proximal Right Humerus fracture  General  Chart Reviewed: Yes, Orders, Progress Notes, History and Physical, Imaging  Patient assessed for rehabilitation services?: Yes  Additional Pertinent Hx: Recent left foot fractures with remote special shoe use; Remote Right arm fracture (11 -years ago);   Left elbow fracture;  Right wrist fracture  Family / Caregiver Present: No  Referring Practitioner: Dr Kenzie Samuels  Diagnosis: Fall with Proximal Right Humerus fracture    Restrictions  Restrictions/Precautions: Weight Bearing, General Precautions, Fall Risk  Other position/activity restrictions: Right Upper Extremity  non-weight-bearing, no rotation shoulder  Right Upper Extremity Weight Bearing: Non Weight Bearing  Right Upper Extremity Brace/Splint: Arm sling with trunk strap to hold arm close to body due to proximal humerus fracture:  Sling straps were adjusted for fit and modified with a pull tab to assist patient with donning/doffing sling  Required Braces or Orthoses  Right Upper Extremity Brace/Splint: Sling  Right Upper Extremity Brace/Splint: Arm sling with trunk strap to hold arm close to body due to proximal humerus fracture:  Sling straps were adjusted for fit and modified with a pull tab to assist patient with donning/doffing sling  Required Braces or Orthoses?: Yes      Subjective  Subjective: pt states that she crawled from the bathroom to the living room to reach phone to call 911. She was on the floor for about 90 minutespt states that she has a personal alert button but wasn't wearing at time of fall. Writer educated pt on importance of wearing device  Comments: pt alert and cooperative. very talkative req VCs for redirection at times. Patient Currently in Pain: Yes  Pain Level: 2  Pain Location: Hand  Pain Orientation: Right  Overall Orientation Status: Within Functional Limits     Pain Assessment  Pain Assessment: 0-10  Pain Level: 2  Pain Type: Acute pain  Pain Location: Hand  Pain Orientation: Right  Clinical Progression: Gradually improving    Objective     Bed mobility  Comment: pt up in chair  Balance  Sitting Balance: Modified independent   Standing Balance: Contact guard assistance  Standing Balance  Time: ~2 minutes with straight cane  Activity: chair>toilet  Comment: VC for object awareness  Functional Mobility  Functional - Mobility Device: Cane  Activity: To/from bathroom  Assist Level: Contact guard assistance  Functional Mobility Comments: Unable to get up without assistance from bedside chair, toilet   ADL  Feeding: Setup;Contact guard assistance; Increased time to complete;Dentures (Has a partial denture at home)  Grooming: Moderate assistance  UE Bathing: Maximum assistance  LE Bathing: Maximum assistance  UE Dressing: Dependent/Total  LE Dressing: Moderate assistance  Toileting: Minimal assistance  Additional Comments: answers based on skilled reasoning/observation     Transfers  Sit to stand: Contact guard assistance  Stand to sit: Contact guard assistance  Transfer Comments: VC for hand placement and positioning.  pt abby JOSHI carryover   Toilet Transfers  Toilet - Technique: Ambulating  Equipment Used: Standard toilet (c rails)  Toilet Transfer: Contact guard assistance  Toilet Transfers Comments: VC for hand placement and positioning. pt demo G carryover   Exercises  Grasp/Release: R hand, HEP c pink sponge x 15 reps to decrease hand swelling and maintain strength        Additional Activities: pt educated on pillow placement for RUE support                 Assessment  Performance deficits / Impairments: Decreased functional mobility ; Decreased ADL status; Decreased strength;Decreased safe awareness;Decreased endurance;Decreased coordination  Prognosis: Good  Discharge Recommendations: Continue to assess pending progress; Patient would benefit from continued therapy after discharge  Activity Tolerance: Patient Tolerated treatment well  Safety Devices in place: Yes  Type of devices: Left in chair;Call light within reach             Patient Education:  Patient Education: OT POC, esperanza-tech and compensatort tech for grooming, UB/LB bathing/dressing, proper positioning and support of RUE when sling is off for sleeping/bathing, NWB precaution, home safety  Learner:patient  Method: demonstration and explanation       Outcome: acknowledged understanding, demonstrated understanding    Plan  Safety Devices  Safety Devices in place: Yes  Type of devices: Left in chair, Call light within reach  Plan  Times per week: 2-5 sessions  Times per day: Twice a day  Current Treatment Recommendations: Strengthening, Functional Mobility Training, Endurance Training, Pain Management, Safety Education & Training, Patient/Caregiver Education & Training, Positioning, Self-Care / ADL, Home Management Training      Goals  Short term goals  Time Frame for Short term goals: 1-5 days  Short term goal 1: Tolerate 10-25  minutes of self care without increased pain or excessive fatigue  Short term goal 2: D/V understanding of Modified care strategies and use of devices to support self care independence and safety  Short term goal 3: D/V understanding of Modified functional mobility / transfer safety and independnece as needed for return to home  Short term

## 2021-06-02 NOTE — PROGRESS NOTES
Elbow surgery (2010); Tubal ligation; Tonsillectomy; and Wrist surgery (1996). Restrictions  Restrictions/Precautions  Restrictions/Precautions: Fall Risk  Required Braces or Orthoses?: Yes (sling right arm, peripheral IV right proximal forearm)  Upper Extremity Weight Bearing Restrictions  Right Upper Extremity Weight Bearing: Non Weight Bearing  Required Braces or Orthoses  Right Upper Extremity Brace/Splint: Sling  Right Upper Extremity Brace/Splint: Arm sling with trunk strap to hold arm close to body due to proximal humerus fracture:  Sling straps were adjusted for fit and modified with a pull tab to assist patient with donning/doffing sling  Position Activity Restriction  Other position/activity restrictions: Right Upper Extremity  non-weight-bearing, no rotation shoulder  Subjective   General  Response To Previous Treatment: Patient reporting fatigue but able to participate. Family / Caregiver Present: No  Referring Practitioner: Dr. Brooke Gomes: pt reports that she fell while standing up from the toilet. Pt reports that she had fallen asleep on the commode and her legs were numb when she attempted to stand up and fell. Pt reports she fell onto the right arm striking the walk in shower. Pt reports hx of recent left 3rd metatarsal head fracture.   Pain Screening  Patient Currently in Pain: Yes  Pain Assessment  Pain Assessment: 0-10  Pain Level: 2  Patient's Stated Pain Goal: No pain  Pain Type: Acute pain  Pain Location: Hand  Pain Orientation: Right  Pain Descriptors: Numbness  Pain Frequency: Continuous  Pain Onset: On-going  Clinical Progression: Gradually improving  Non-Pharmaceutical Pain Intervention(s): Ambulation/Increased Activity;Repositioned  Response to Pain Intervention: Patient Satisfied  Vital Signs  Patient Currently in Pain: Yes       Orientation  Orientation  Overall Orientation Status: Within Functional Limits (answered all questions correctly)  Cognition Objective   Bed mobility  Rolling to Left: Minimal assistance  Rolling to Right:  (deferred rolling to the right due to right humeral fracture/ sling)  Sit to Supine: Moderate assistance  Transfers  Sit to Stand: Contact guard assistance  Stand to sit: Contact guard assistance  Stand Pivot Transfers: Contact guard assistance (SC )  Comment: increaed time to complete   Ambulation  Ambulation?: Yes  Ambulation 1  Surface: level tile  Device: Single point cane  Assistance: Contact guard assistance  Gait Deviations: Deviated path; Slow Jeanne  Distance: 70'  x 2 w/ 2 standing turns  Comments: sling right arm, hx recent left 3rd metatarsal head fracture (wearing padded slipper)  Stairs/Curb  Stairs?: No (level entry apartment)     Balance  Sitting - Static: Good  Sitting - Dynamic: Good  Standing - Static: Good;- (used s cane)  Standing - Dynamic: Fair (used s cane)  Other exercises  Other exercises?: Yes  Other exercises 1: supine  bilateral LEs  x 20  reps                        G-Code     OutComes Score                                                     AM-PAC Score     AM-PAC Inpatient Mobility without Stair Climbing Raw Score : 14 (05/30/21 1220)  AM-PAC Inpatient without Stair Climbing T-Scale Score : 40.85 (05/30/21 1220)  Mobility Inpatient CMS 0-100% Score: 53.33 (05/30/21 1220)  Mobility Inpatient without Stair CMS G-Code Modifier : CK (05/30/21 1220)       Goals  Short term goals  Time Frame for Short term goals: 5-7 treatments/ week  Short term goal 1: pt to tolerate 1/2 hour of therapuetic exercise and activity  Short term goal 2: pt to demonstrate good technique for balance activities and general LE strengthening exercises  Short term goal 3: pt to demonstrate rolling in bed to the left w/ SBA x 1 using the rail to reposition herself  Short term goal 4: pt to demonstrate transfers supine <> sit w/ min x 1 and sit <> stand & bed <> chair w/ CGA x 1 using s cane while wearing sling right arm  Short term goal 5: pt to demonstrate gait 50-80' using s cane w/ CGA x 1 while wearing sling right arm  Short term goal 6: pt to demonstrate good (-) or better ambulatory balance using s cane  while wearing sling right arm  Patient Goals   Patient goals : eventual return to her apartment    Plan    Plan  Times per week: 5-7 treatments/ week  Times per day:  (5-7 treatments/ week)  Specific instructions for Next Treatment: advance gait distance using s cane, instruct in exercise; FALL RISK  Current Treatment Recommendations: Strengthening, Safety Education & Training, Balance Training, Endurance Training, Patient/Caregiver Education & Training, Equipment Evaluation, Education, & procurement, Functional Mobility Training, Transfer Training, Gait Training  Safety Devices  Type of devices:  All fall risk precautions in place, Bed alarm in place, Call light within reach, Gait belt, Patient at risk for falls, Left in bed     Therapy Time   Individual Concurrent Group Co-treatment   Time In 0958         Time Out 1022         Minutes 24         Timed Code Treatment Minutes: Anton Baker PT

## 2021-06-02 NOTE — PROGRESS NOTES
ANAID received call from Dr. Dulce Maria Hernández that pt was approved for SNF. ANAID contacted Moisés Figures at [facility]; they did get the authorization. Transport set for 1330. Pt's son and daughter were both informed. Pt was sleeping and this worker did not wake her. Orders faxed. 7000 completed.

## 2021-06-02 NOTE — PLAN OF CARE
Problem: Falls - Risk of:  Goal: Will remain free from falls  Description: Will remain free from falls  6/2/2021 0452 by Domi Patel RN  Outcome: Ongoing  Note: Patient remains free of incidence/ injury. Bed remains in low position. Up with cane and standby. Problem: Pain:  Goal: Control of acute pain  Description: Control of acute pain  6/2/2021 0452 by Domi Patel RN  Outcome: Ongoing  Note: Pt denies need for prn pain medication. Problem: Infection:  Goal: Will remain free from infection  Description: Will remain free from infection  6/2/2021 0452 by Domi Patel RN  Outcome: Ongoing  Note: Patient displays no new signs of infection during this shift.

## 2021-06-02 NOTE — PROGRESS NOTES
[place] [id number] Internal Medicine    CONSULTATION / HISTORY AND PHYSICAL EXAMINATION            Date:   6/2/2021  Patient name:  Ailyn Median  Date of admission:  5/29/2021 10:35 AM  MRN:   [id number]  Account:  [de-identified]  YOB: 1940  PCP:    No primary care provider on file. Room:   2071/2071-01  Code Status:    Full Code    Physician Requesting Consult: Josie Kocher, MD    Reason for Consult:  medical management    Chief Complaint:     Chief Complaint   Patient presents with   [facility]     with right arm pain, pt states did not hit head. History Obtained From:     Patient medical record nursing staff    History of Present Illness:   Patient past medical history multiple medical problems from hypothyroidism, hypertension, mild degree of aortic stenosis, patient had fractured her left foot after a jar  fell on her left foot in February, she was using a cane and walker, ever since, yesterday, she went to bathroom, she fell asleep on the toilet seat, when she tried to get up from the toilet seat, she lost balance, fell, injured her right arm, she noticed pain, swelling in right arm that make her come to the emergency room, she was found to have displaced fracture of right humerus,  In the emergency room, sling was applied to right humerus, patient as of now, is pain-free,     Past Medical History:     Past Medical History:   Diagnosis Date    Hypertension     Hypothyroidism     Osteoporosis     Uterovaginal prolapse         Past Surgical History:     Past Surgical History:   Procedure Laterality Date    ELBOW SURGERY  2010    TONSILLECTOMY      TUBAL LIGATION      WRIST SURGERY  1996        Medications Prior to Admission:     Prior to Admission medications    Medication Sig Start Date End Date Taking?  Authorizing Provider   Misc Natural Products (LUTEIN 20) CAPS Take by mouth   Yes Historical Provider, MD   calcium carbonate 600 MG TABS tablet Take 1 tablet by mouth 2 times daily. Yes Historical Provider, MD   levothyroxine (LEVOTHROID) 50 MCG tablet Take 50 mcg by mouth daily. Takes 50 mcg 5 days a week  Then 100 mcg sat and sun. Yes Historical Provider, MD   Vitamins-Lipotropics (B-100 PO) Take 1 tablet by mouth. Yes Historical Provider, MD   Multiple Vitamins-Iron (MULTI-VITAMIN/IRON PO) Take 1 tablet by mouth daily. Yes Historical Provider, MD   Ascorbic Acid (VITAMIN C) 500 MG tablet Take 500 mg by mouth 2 times daily. Yes Historical Provider, MD   vitamin E 400 UNIT capsule Take 400 Units by mouth daily    Yes Historical Provider, MD        Allergies:     Latex, Morphine, Alendronate sodium, Ceclor [cefaclor], Codeine, Daypro [oxaprozin], Seldane [terfenadine], and Ultram [tramadol hcl]    Social History:     Tobacco:    reports that she quit smoking about 51 years ago. She has a 4.00 pack-year smoking history. She has never used smokeless tobacco.  Alcohol:      reports no history of alcohol use. Drug Use:  reports no history of drug use. Family History:     History reviewed. No pertinent family history. Review of Systems:     Positive and Negative as described in HPI. CONSTITUTIONAL:  negative for fevers, chills, sweats, fatigue, weight loss  HEENT:  negative for vision, hearing changes, runny nose, throat pain  RESPIRATORY:  negative for shortness of breath, cough, congestion, wheezing. CARDIOVASCULAR:  negative for chest pain, palpitations.   GASTROINTESTINAL:  negative for nausea, vomiting, diarrhea, constipation, change in bowel habits, abdominal pain   GENITOURINARY:  negative for difficulty of urination, burning with urination, frequency   INTEGUMENT:  negative for rash, skin lesions, easy bruising   HEMATOLOGIC/LYMPHATIC:  negative for swelling/edema   ALLERGIC/IMMUNOLOGIC:  negative for urticaria , itching  ENDOCRINE:  negative increase in drinking, increase in urination, hot or cold intolerance  MUSCULOSKELETAL:  Pain in Right Arm   NEUROLOGICAL:  negative for headaches, dizziness, lightheadedness, numbness, pain, tingling extremities  BEHAVIOR/PSYCH:      Physical Exam:     BP (!) 118/53   Pulse 88   Temp 98.2 °F (36.8 °C) (Oral)   Resp 16   Ht 5' 2\" (1.575 m)   Wt 110 lb (49.9 kg)   SpO2 97%   BMI 20.12 kg/m²   Temp (24hrs), Av.8 °F (36.6 °C), Min:97.4 °F (36.3 °C), Max:98.2 °F (36.8 °C)    No results for input(s): POCGLU in the last 72 hours. Intake/Output Summary (Last 24 hours) at 2021 1413  Last data filed at 2021 0942  Gross per 24 hour   Intake --   Output 1700 ml   Net -1700 ml       General Appearance:  alert, well appearing, and in no acute distress  Mental status: oriented to person, place, and time with normal affect  Head:  normocephalic, atraumatic. Eye: no icterus, redness, pupils equal and reactive, extraocular eye movements intact, conjunctiva clear  Ear: normal external ear, no discharge, hearing intact  Nose:  no drainage noted  Mouth: mucous membranes moist  Neck: supple, no carotid bruits, thyroid not palpable  Lungs: Bilateral equal air entry, clear to ausculation, no wheezing, rales or rhonchi, normal effort  Cardiovascular: normal rate, regular rhythm, systolic murmur in aortic area, gallop, rub. Abdomen: Soft, nontender, nondistended, normal bowel sounds, no hepatomegaly or splenomegaly  Neurologic: There are no new focal motor or sensory deficits, normal muscle tone and bulk, no abnormal sensation, normal speech, cranial nerves II through XII grossly intact  Skin: No gross lesions, rashes, bruising or bleeding on exposed skin area  Extremities:  Right arm in sling , tenderness left foot  Psych: Investigations:      Laboratory Testing:  No results found for this or any previous visit (from the past 24 hour(s)).         Consultations:   IP CONSULT TO INTERNAL MEDICINE  IP CONSULT TO ORTHOPEDIC SURGERY  IP CONSULT TO SOCIAL WORK  Assessment :      Primary Problem  <principal problem not specified>    Active Hospital Problems    Diagnosis Date Noted    Nonrheumatic aortic valve stenosis [I35.0] 05/30/2021    Decreased ambulation status [Z74.09] 05/30/2021    Fracture of humeral head, closed, right, initial encounter Edilia Carrillo 05/29/2021    Hypertension [I10]     Hypothyroidism [E03.9]      Active Problems:    Hypertension    Hypothyroidism    Fracture of humeral head, closed, right, initial encounter    Nonrheumatic aortic valve stenosis    Decreased ambulation status  Resolved Problems:    * No resolved hospital problems. *      Plan:     Right sided displaced humerus fracture, orthopedic surgery consulted, right arm in sling  Patient has mild degree of arctic stenosis, no echocardiogram in the system, patient mention she has seen her cardiologist 6 years ago, patient before her foot fracture was very active, able to walk 1 block easily  Hypertension, patient does not want to be on any medication for blood pressure control, understand the risks  On DVT prophylaxis Lovenox  PT/OT consulted   consulted for placement, although patient is reluctant to go to nursing home, if no surgical intervention planned by orthopedic surgery (she appears reluctant to undergo surgical procedure as well,), and patient does not want to go to nursing facility will work on discharge planning  If there will be, plan for surgical intervention, in that case, patient need to undergo echocardiogram to look for degree of aortic stenosis since patient did not have any work-up in last 6 years    5/31  Awaiting placement  Not a surgical candidate. 6/1  Discharge planning  Awaiting placement    6/2  Awaiting placement  Ophelia Kent MD  6/2/2021  2:13 PM    Copy sent to Dr. Saundra Monreal primary care provider on file. Please note that this chart was generated using voice recognition Dragon dictation software.   Although every effort was made to ensure the accuracy of this automated transcription, some errors in transcription may have occurred.

## 2021-06-03 ENCOUNTER — TELEPHONE (OUTPATIENT)
Dept: ORTHOPEDIC SURGERY | Age: 81
End: 2021-06-03

## 2021-06-03 NOTE — TELEPHONE ENCOUNTER
Annabella Landon from occupational therapy called to clarify the therapy orders she stated they were unclear about weight baring and range of motion. Please advise and address thank you!

## 2021-06-04 NOTE — TELEPHONE ENCOUNTER
Good morning,     This was sent to Dr. Elina Bates office but it does look like this is regarding her most recent fall on 5/30/21 as Dr. Derrick Ruffin did not place any PT/OT orders at her last appointment in April. I will give this facility a call to let them know our office is not the appropriate contact but wanted to send your way. Thank you,    Michaela Torres MS, LAT, ATC   for Dr. Finn Herbert MD  [facility]  Email: [email or web address]  Phone: [phone]  Fax: [phone]

## 2021-06-04 NOTE — TELEPHONE ENCOUNTER
Spoke with Leopold Linen to let her know Dr. Rommie Holter was not the treating provider but I forwarded the message to Dr. Nilay Cason.   Isis's phone number (which seems to be a cell phone) is [phone]

## 2021-06-15 ENCOUNTER — TELEPHONE (OUTPATIENT)
Dept: INTERNAL MEDICINE CLINIC | Age: 81
End: 2021-06-15

## 2021-06-16 NOTE — TELEPHONE ENCOUNTER
Unfortunately I cannot answer this question, patient was seen by orthopedic surgery while in hospital, on 5/31, the other one,  who should answer this question.

## 2021-06-21 ENCOUNTER — OFFICE VISIT (OUTPATIENT)
Dept: ORTHOPEDIC SURGERY | Age: 81
End: 2021-06-21
Payer: COMMERCIAL

## 2021-06-21 VITALS — BODY MASS INDEX: 19.49 KG/M2 | RESPIRATION RATE: 16 BRPM | WEIGHT: 110 LBS | HEIGHT: 63 IN

## 2021-06-21 DIAGNOSIS — S92.302D MULTIPLE CLOSED FRACTURES OF METATARSAL BONE OF LEFT FOOT WITH ROUTINE HEALING, SUBSEQUENT ENCOUNTER: Primary | ICD-10-CM

## 2021-06-21 DIAGNOSIS — S42.291A OTHER CLOSED DISPLACED FRACTURE OF PROXIMAL END OF RIGHT HUMERUS, INITIAL ENCOUNTER: ICD-10-CM

## 2021-06-21 PROCEDURE — 99214 OFFICE O/P EST MOD 30 MIN: CPT | Performed by: ORTHOPAEDIC SURGERY

## 2021-06-21 NOTE — PROGRESS NOTES
[facility] [address] [facility]  [address]  [address]  Dept: [phone]    Ambulatory Orthopedic Consult      CHIEF COMPLAINT:    Chief Complaint   Patient presents with    Foot Pain     left    Shoulder Pain     Right       HISTORY OF PRESENT ILLNESS:      The patient is a 78 y.o. female who is being seen for evaluation of pain at the above location at the forefoot/midfoot, secondary to an injury that occurred around 2/1/2021. The patient reports that she dropped a canned good on the dorsal aspect of her foot, and then dropped another can of soup on her foot about a week later. She also reports a third instance of bumping her foot and/or dropping something on it prior to this visit since her initial injury. INTERVAL HISTORY 4/26/2021:  She is seen again today in the office for follow up of a previous issue (as above). Since being seen last, the patient is doing better. At today's visit, she is using a removable brace. History is obtained today from:   [x]  the patient     []  EMR     []  one family member/friend    []  multiple family members/friends    []  other:      INTERVAL HISTORY 6/21/2021:  She is seen again today in the office for evaluation of a new issue as above, which began 5/29/2021 secondary to a fall  . At today's visit, she is using a sling. History is obtained today from:   [x]  the patient     []  EMR     []  one family member/friend    []  multiple family members/friends    []  other:      Regarding the previous issue, she reports the previous problem is doing better. She is ambulating today using a wheelchair. REVIEW OF SYSTEMS:  Constitutional: Negative for fever. HENT: Negative for tinnitus. Eyes: Negative for pain. Respiratory: Negative for shortness of breath. Cardiovascular: Negative for chest pain. Gastrointestinal: Negative for abdominal pain. Genitourinary: Negative for dysuria. Skin: Negative for rash. Neurological: Negative for headaches. Hematological: Does not bruise/bleed easily. Musculoskeletal: See HPI for pertinent positives     Past Medical History:    She  has a past medical history of Hypertension, Hypothyroidism, Osteoporosis, and Uterovaginal prolapse. Past Surgical History:    She  has a past surgical history that includes Elbow surgery (); Tubal ligation; Tonsillectomy; and Wrist surgery (). Current Medications:     Current Outpatient Medications:     Misc Natural Products (LUTEIN 20) CAPS, Take by mouth, Disp: , Rfl:     calcium carbonate 600 MG TABS tablet, Take 1 tablet by mouth 2 times daily. , Disp: , Rfl:     levothyroxine (LEVOTHROID) 50 MCG tablet, Take 50 mcg by mouth daily. Takes 50 mcg 5 days a week  Then 100 mcg sat and sun., Disp: , Rfl:     Vitamins-Lipotropics (B-100 PO), Take 1 tablet by mouth.  , Disp: , Rfl:     Multiple Vitamins-Iron (MULTI-VITAMIN/IRON PO), Take 1 tablet by mouth daily. , Disp: , Rfl:     Ascorbic Acid (VITAMIN C) 500 MG tablet, Take 500 mg by mouth 2 times daily. , Disp: , Rfl:     vitamin E 400 UNIT capsule, Take 400 Units by mouth daily , Disp: , Rfl:      Allergies:    Latex, Morphine, Alendronate sodium, Ceclor [cefaclor], Codeine, Daypro [oxaprozin], Seldane [terfenadine], and Ultram [tramadol hcl]    Family History:  family history is not on file.     Social History:   Social History     Occupational History    Not on file   Tobacco Use    Smoking status: Former Smoker     Packs/day: 0.50     Years: 8.00     Pack years: 4.00     Quit date: 12/15/1969     Years since quittin.5    Smokeless tobacco: Never Used   Substance and Sexual Activity    Alcohol use: No    Drug use: No    Sexual activity: Not Currently     Occupation: Retired     OBJECTIVE:  Resp 16   Ht 5' 3\" (1.6 m)   Wt 110 lb (49.9 kg)   BMI 19.49 kg/m²    Psych: alert and oriented to person, time, and place  Cardio:  well perfused extremities  Resp:  normal respiratory effort  Skin:  no cyanosis  Hem/lymph:  no lymphedema  Neuro:  sensation to light touch grossly intact throughout all nerve distributions in the foot   Musculoskeletal:    MUSCULOSKELETAL (left lower extremity):  Vascular: Toes warm and well perfused, compartments soft/compressible, no significant swelling of ankle/foot. Skin:  Intact over foot/ankle, without rash/lesions/AV malformations. Motion: Able to wiggle toes  -Range of motion of foot/ankle grossly normal  -Tenderness to palpation: Forefoot/midfoot--minimal      Right upper extremity:    Vascular: Limb well perfused, compartments soft/compressible. Skin: No erythema/ulcers. Intact. Neurovascular Status:  Grossly neurovascularly intact throughout  Motion:  Grossly able to fire major muscle groups with appropriate/expected AROM  Tenderness to Palpation:  shoulder diffusely   -Weakness/pain with shoulder elevation  -Decreased elbow extension, but no elbow/wrist tenderness      RADIOLOGY:   6/21/2021 FINDINGS:  Three views (AP, Oblique, Lateral) of the left foot and 4 views (AP, Grashey, scapular, and axillary) of the right shoulder were obtained in the office today and reviewed, revealing fractures at the distal second and third metatarsal shafts with mild shortening; no interval displacement, but interval healing is noted. Displaced right proximal humerus fracture at the surgical neck with interval healing noted when compared to prior x-rays. IMPRESSION:  Osseous injury as above. Electronically signed by Abner Hodge MD      ASSESSMENT AND PLAN:  Body mass index is 19.49 kg/m². She has second and third distal metatarsal shaft fractures with mild shortening, sustained around 2/1/2021. She is doing well overall with conservative management. She also has a right displaced proximal humerus fracture, sustained on 5/29/2021. Notably, she has the past medical history as above.   She has a history of hypothyroidism and osteoporosis. We had a discussion today about the likely diagnosis and its natural history, physical exam and imaging findings, as well as various treatment options in detail. Surgically, we discussed that I do not recommend any surgical intervention at this time, I did recommend continue conservative management for both her left foot and her right shoulder. We did discuss the possibility of a future right shoulder surgery, depending on her clinical course and how she heals, and she does understand this; we also discussed that given her age and activity level, a surgery will likely not be needed. Orders/referrals were placed as below at today's visit. We discussed the risks of stiffness, and discussed the importance of range of motion. She will discontinue the sling, and begin physical therapy/occupational therapy for range of motion of her shoulder/elbow/wrist.  And she may continue to weight-bear as tolerated in a regular supportive shoe on the left foot. All questions were answered and the above plan was agreed upon. The patient will return to clinic in 6 weeks with left foot x-rays and right shoulder x-rays. At the patient's next visit, depending on how the patient is doing and/or new imaging/labs results, we may consider the following options:    []  Orthotic (OTC)     []  Orthotic (custom)          []  Rocker bottom shoes     []  Brace (OTC)        []  Brace (custom)             []  CAM boot        []  Night splint         []  Heel cups        []  Strap      []  Toe sleeves/splints    []  PT:                     []  Wean out of immobilization   []  Advance activity       []  Topical               []  NSAIDs          []  Josiah         []  Referral:         []  Stress xrays       []  CT         []  MRI        []  Injection:         []  Consider OR      []  Pick OR date    Return in about 6 weeks (around 8/2/2021).     No orders of the defined types were placed in this encounter. Orders Placed This Encounter   Procedures    XR SHOULDER RIGHT (MIN 2 VIEWS)     Standing Status:   Future     Number of Occurrences:   1     Standing Expiration Date:   6/21/2022         Abebe Sanchez MD  Orthopedic Surgery        Please excuse any typos/errors, as this note was created with the assistance of voice recognition software. While intending to generate a document that actually reflects the content of the visit, the document can still have some errors including those of syntax and sound-a-like substitutions which may escape proof reading. In such instances, actual meaning can be extrapolated by context.

## 2021-06-21 NOTE — TELEPHONE ENCOUNTER
Called Isis at [facility]. Advised of Dr Sonali Aj note. She stated she did not call Dr Emilee Mcnamara office and she would contact them.

## 2021-06-25 ENCOUNTER — TELEPHONE (OUTPATIENT)
Dept: ORTHOPEDIC SURGERY | Age: 81
End: 2021-06-25

## 2021-07-22 DIAGNOSIS — S92.302D MULTIPLE CLOSED FRACTURES OF METATARSAL BONE OF LEFT FOOT WITH ROUTINE HEALING, SUBSEQUENT ENCOUNTER: ICD-10-CM

## 2021-07-22 DIAGNOSIS — M25.511 RIGHT SHOULDER PAIN, UNSPECIFIED CHRONICITY: Primary | ICD-10-CM

## 2021-08-02 ENCOUNTER — OFFICE VISIT (OUTPATIENT)
Dept: ORTHOPEDIC SURGERY | Age: 81
End: 2021-08-02
Payer: COMMERCIAL

## 2021-08-02 VITALS — HEIGHT: 63 IN | WEIGHT: 110 LBS | RESPIRATION RATE: 18 BRPM | BODY MASS INDEX: 19.49 KG/M2

## 2021-08-02 DIAGNOSIS — S42.291D OTHER CLOSED DISPLACED FRACTURE OF PROXIMAL END OF RIGHT HUMERUS WITH ROUTINE HEALING, SUBSEQUENT ENCOUNTER: ICD-10-CM

## 2021-08-02 DIAGNOSIS — S92.302D MULTIPLE CLOSED FRACTURES OF METATARSAL BONE OF LEFT FOOT WITH ROUTINE HEALING, SUBSEQUENT ENCOUNTER: Primary | ICD-10-CM

## 2021-08-02 PROCEDURE — 99213 OFFICE O/P EST LOW 20 MIN: CPT | Performed by: ORTHOPAEDIC SURGERY

## 2021-08-02 NOTE — PROGRESS NOTES
[facility] [address] [facility]  [address]  [address]  Dept: [phone]    Ambulatory Orthopedic Consult      CHIEF COMPLAINT:    Chief Complaint   Patient presents with    Foot Pain     left    Shoulder Pain     right       HISTORY OF PRESENT ILLNESS:      The patient is a 78 y.o. female who is being seen for evaluation of pain at the above location at the forefoot/midfoot, secondary to an injury that occurred around 2/1/2021. The patient reports that she dropped a canned good on the dorsal aspect of her foot, and then dropped another can of soup on her foot about a week later. She also reports a third instance of bumping her foot and/or dropping something on it prior to this visit since her initial injury. INTERVAL HISTORY 4/26/2021:  She is seen again today in the office for follow up of a previous issue (as above). Since being seen last, the patient is doing better. At today's visit, she is using a removable brace. History is obtained today from:   [x]  the patient     []  EMR     []  one family member/friend    []  multiple family members/friends    []  other:      INTERVAL HISTORY 6/21/2021:  She is seen again today in the office for evaluation of a new issue as above, which began 5/29/2021 secondary to a fall  . At today's visit, she is using a sling. History is obtained today from:   [x]  the patient     []  EMR     []  one family member/friend    []  multiple family members/friends    []  other:      Regarding the previous issue, she reports the previous problem is doing better. She is ambulating today using a wheelchair. INTERVAL HISTORY 8/2/2021:  She is seen again today in the office for follow up of a previous issue (as above). Since being seen last, the patient is doing better. At today's visit, she is using a cane.     History is obtained today from:   [x]  the patient     []  EMR     []  one family member/friend    []  multiple family members/friends    []  other:          REVIEW OF SYSTEMS:  Constitutional: Negative for fever. HENT: Negative for tinnitus. Eyes: Negative for pain. Respiratory: Negative for shortness of breath. Cardiovascular: Negative for chest pain. Gastrointestinal: Negative for abdominal pain. Genitourinary: Negative for dysuria. Skin: Negative for rash. Neurological: Negative for headaches. Hematological: Does not bruise/bleed easily. Musculoskeletal: See HPI for pertinent positives     Past Medical History:    She  has a past medical history of Hypertension, Hypothyroidism, Osteoporosis, and Uterovaginal prolapse. Past Surgical History:    She  has a past surgical history that includes Elbow surgery (2010); Tubal ligation; Tonsillectomy; and Wrist surgery (1996). Current Medications:     Current Outpatient Medications:     Misc Natural Products (LUTEIN 20) CAPS, Take by mouth, Disp: , Rfl:     calcium carbonate 600 MG TABS tablet, Take 1 tablet by mouth 2 times daily. , Disp: , Rfl:     levothyroxine (LEVOTHROID) 50 MCG tablet, Take 50 mcg by mouth daily. Takes 50 mcg 5 days a week  Then 100 mcg sat and sun., Disp: , Rfl:     Vitamins-Lipotropics (B-100 PO), Take 1 tablet by mouth.  , Disp: , Rfl:     Multiple Vitamins-Iron (MULTI-VITAMIN/IRON PO), Take 1 tablet by mouth daily. , Disp: , Rfl:     Ascorbic Acid (VITAMIN C) 500 MG tablet, Take 500 mg by mouth 2 times daily. , Disp: , Rfl:     vitamin E 400 UNIT capsule, Take 400 Units by mouth daily , Disp: , Rfl:      Allergies:    Latex, Morphine, Alendronate sodium, Ceclor [cefaclor], Codeine, Daypro [oxaprozin], Seldane [terfenadine], and Ultram [tramadol hcl]    Family History:  family history is not on file.     Social History:   Social History     Occupational History    Not on file   Tobacco Use    Smoking status: Former Smoker     Packs/day: 0.50     Years: 8.00     Pack years: 4.00     Quit date: 12/15/1969     Years since quittin.6    Smokeless tobacco: Never Used   Substance and Sexual Activity    Alcohol use: No    Drug use: No    Sexual activity: Not Currently     Occupation: Retired     OBJECTIVE:  Resp 18   Ht 5' 3\" (1.6 m)   Wt 110 lb (49.9 kg)   BMI 19.49 kg/m²    Psych: alert and oriented to person, time, and place  Cardio:  well perfused extremities  Resp:  normal respiratory effort  Skin:  no cyanosis  Hem/lymph:  no lymphedema  Neuro:  sensation to light touch grossly intact throughout all nerve distributions in the foot   Musculoskeletal:    MUSCULOSKELETAL (left lower extremity):  Vascular: Toes warm and well perfused, compartments soft/compressible, no significant swelling of ankle/foot. Skin:  Intact over foot/ankle, without rash/lesions/AV malformations. Motion: Able to wiggle toes  -Range of motion of foot/ankle grossly normal  -Tenderness to palpation: None      Right upper extremity:    Vascular: Limb well perfused, compartments soft/compressible. Skin: No erythema/ulcers. Intact. Neurovascular Status:  Grossly neurovascularly intact throughout  Motion:  Grossly able to fire major muscle groups with appropriate/expected AROM  Tenderness to Palpation:  shoulder diffusely --minimal  -Decreased shoulder range of motion, able to get approximately 80 degrees of elevation      RADIOLOGY:   2021 FINDINGS:  Three views (AP, Oblique, Lateral) of the left foot and 4 views (AP, Grashey, scapular, and axillary) of the right shoulder were obtained in the office today and reviewed, revealing fractures at the distal second and third metatarsal shafts with mild shortening, with interval healing without interval displacement. Displaced right proximal humerus fracture at the surgical neck with interval healing without interval displacement. IMPRESSION:  Osseous injury as above. Electronically signed by Jamal Back MD      ASSESSMENT AND PLAN:  Body mass index is 19.49 kg/m².        She has second and third distal metatarsal shaft fractures with mild shortening, sustained around 2/1/2021. She has healed well with conservative management. She also has a right displaced proximal humerus fracture, sustained on 5/29/2021. She is doing well overall with conservative management. Notably, she has the past medical history as above. She has a history of hypothyroidism and osteoporosis. We had a discussion today about the likely diagnosis and its natural history, physical exam and imaging findings, as well as various treatment options in detail. Surgically, I did not recommend any surgical invention for her right shoulder, and we have discussed the possibility of a future right shoulder surgery, depending on her overall clinical course. At this time, she does wish to continue with conservative management. Orders/referrals were placed as below at today's visit. She was again referred to physical therapy, and will continue to progress her activity with her right shoulder, we discussed that she may continue activity as tolerated without specific restrictions, other than avoiding pain provoking activity. We discussed the risks of stiffness, and discussed continuing to work on her range of motion and function. All questions were answered and the above plan was agreed upon. The patient will return to clinic in 3 months with repeat right shoulder x-rays.            At the patient's next visit, depending on how the patient is doing and/or new imaging/labs results, we may consider the following options:    []  Orthotic (OTC)     []  Orthotic (custom)          []  Rocker bottom shoes     []  Brace (OTC)        []  Brace (custom)             []  CAM boot        []  Night splint         []  Heel cups        []  Strap      []  Toe sleeves/splints    []  PT:                     []  Wean out of immobilization   []  Advance activity       []  Topical               []  NSAIDs          []  Josiah         [] Referral:         []  Stress xrays       []  CT         []  MRI        []  Injection:         []  Consider OR      []  Pick OR date    No follow-ups on file. No orders of the defined types were placed in this encounter. Orders Placed This Encounter   Procedures    Ambulatory referral to Physical Therapy     Referral Priority:   Routine     Referral Type:   Eval and Treat     Referral Reason:   Specialty Services Required     Requested Specialty:   Physical Therapy     Number of Visits Requested:   1         Marko Herrmann MD  Orthopedic Surgery        Please excuse any typos/errors, as this note was created with the assistance of voice recognition software. While intending to generate a document that actually reflects the content of the visit, the document can still have some errors including those of syntax and sound-a-like substitutions which may escape proof reading. In such instances, actual meaning can be extrapolated by context.

## 2021-11-05 DIAGNOSIS — S42.291D OTHER CLOSED DISPLACED FRACTURE OF PROXIMAL END OF RIGHT HUMERUS WITH ROUTINE HEALING, SUBSEQUENT ENCOUNTER: Primary | ICD-10-CM

## 2021-11-08 ENCOUNTER — OFFICE VISIT (OUTPATIENT)
Dept: ORTHOPEDIC SURGERY | Age: 81
End: 2021-11-08
Payer: COMMERCIAL

## 2021-11-08 VITALS — BODY MASS INDEX: 19.49 KG/M2 | HEIGHT: 63 IN | TEMPERATURE: 97.9 F | RESPIRATION RATE: 14 BRPM | WEIGHT: 110 LBS

## 2021-11-08 DIAGNOSIS — S42.291D OTHER CLOSED DISPLACED FRACTURE OF PROXIMAL END OF RIGHT HUMERUS WITH ROUTINE HEALING, SUBSEQUENT ENCOUNTER: Primary | ICD-10-CM

## 2021-11-08 PROCEDURE — 99213 OFFICE O/P EST LOW 20 MIN: CPT | Performed by: ORTHOPAEDIC SURGERY

## 2021-11-08 NOTE — PROGRESS NOTES
[facility] [id number] [facility]  [address]  [address]  Dept: [phone]    Ambulatory Orthopedic Consult      CHIEF COMPLAINT:    Chief Complaint   Patient presents with    Foot Pain     left    Shoulder Pain     right       HISTORY OF PRESENT ILLNESS:      The patient is a 78 y.o. female who is being seen for evaluation of pain at the above location at the forefoot/midfoot, secondary to an injury that occurred around 2/1/2021. The patient reports that she dropped a canned good on the dorsal aspect of her foot, and then dropped another can of soup on her foot about a week later. She also reports a third instance of bumping her foot and/or dropping something on it prior to this visit since her initial injury. INTERVAL HISTORY 4/26/2021:  She is seen again today in the office for follow up of a previous issue (as above). Since being seen last, the patient is doing better. At today's visit, she is using a removable brace. History is obtained today from:   [x]  the patient     []  EMR     []  one family member/friend    []  multiple family members/friends    []  other:      INTERVAL HISTORY 6/21/2021:  She is seen again today in the office for evaluation of a new issue as above, which began 5/29/2021 secondary to a fall  . At today's visit, she is using a sling. History is obtained today from:   [x]  the patient     []  EMR     []  one family member/friend    []  multiple family members/friends    []  other:      Regarding the previous issue, she reports the previous problem is doing better. She is ambulating today using a wheelchair. INTERVAL HISTORY 8/2/2021:  She is seen again today in the office for follow up of a previous issue (as above). Since being seen last, the patient is doing better. At today's visit, she is using a cane.     History is obtained today from:   [x]  the patient     []  EMR     []  one family member/friend    []  multiple family members/friends    []  other:      INTERVAL HISTORY 2021:  She is seen again today in the office for follow up of a previous issue (as above). Since being seen last, the patient is doing better. At today's visit, she is using a cane. History is obtained today from:   [x]  the patient     []  EMR     []  one family member/friend    []  multiple family members/friends    []  other:        REVIEW OF SYSTEMS:  Musculoskeletal: See HPI for pertinent positives     Past Medical History:    She  has a past medical history of Hypertension, Hypothyroidism, Osteoporosis, and Uterovaginal prolapse. Past Surgical History:    She  has a past surgical history that includes Elbow surgery (); Tubal ligation; Tonsillectomy; and Wrist surgery (). Current Medications:     Current Outpatient Medications:     Misc Natural Products (LUTEIN 20) CAPS, Take by mouth, Disp: , Rfl:     calcium carbonate 600 MG TABS tablet, Take 1 tablet by mouth 2 times daily. , Disp: , Rfl:     levothyroxine (LEVOTHROID) 50 MCG tablet, Take 50 mcg by mouth daily. Takes 50 mcg 5 days a week  Then 100 mcg sat and sun., Disp: , Rfl:     Vitamins-Lipotropics (B-100 PO), Take 1 tablet by mouth.  , Disp: , Rfl:     Multiple Vitamins-Iron (MULTI-VITAMIN/IRON PO), Take 1 tablet by mouth daily. , Disp: , Rfl:     Ascorbic Acid (VITAMIN C) 500 MG tablet, Take 500 mg by mouth 2 times daily. , Disp: , Rfl:     vitamin E 400 UNIT capsule, Take 400 Units by mouth daily , Disp: , Rfl:      Allergies:    Latex, Morphine, Alendronate sodium, Ceclor [cefaclor], Codeine, Daypro [oxaprozin], Seldane [terfenadine], and Ultram [tramadol hcl]    Family History:  family history is not on file.     Social History:   Social History     Occupational History    Not on file   Tobacco Use    Smoking status: Former Smoker     Packs/day: 0.50     Years: 8.00     Pack years: 4.00     Quit date: 12/15/1969     Years since quittin.9    Smokeless tobacco: Never Used   Substance and Sexual Activity    Alcohol use: No    Drug use: No    Sexual activity: Not Currently     Occupation: Retired     OBJECTIVE:  Temp 97.9 °F (36.6 °C)   Resp 14   Ht 5' 3\" (1.6 m)   Wt 110 lb (49.9 kg)   BMI 19.49 kg/m²    Psych: alert and oriented to person, time, and place  Cardio:  well perfused extremities  Resp:  normal respiratory effort  Skin:  no cyanosis  Hem/lymph:  no lymphedema  Neuro:  sensation to light touch grossly intact throughout all nerve distributions in the foot   Musculoskeletal:    Right upper extremity:    Vascular: Limb well perfused, compartments soft/compressible. Skin: No erythema/ulcers. Intact. Neurovascular Status:  Grossly neurovascularly intact throughout  Motion:  Grossly able to fire major muscle groups with appropriate/expected AROM  Tenderness to Palpation: None  -Decreased shoulder range of motion, but continuing to improve, now with approximately 100 degrees of motion    (Previous exam)    left lower extremity:  Vascular: Toes warm and well perfused, compartments soft/compressible, no significant swelling of ankle/foot. Skin:  Intact over foot/ankle, without rash/lesions/AV malformations. Motion: Able to wiggle toes  -Range of motion of foot/ankle grossly normal  -Tenderness to palpation: None      RADIOLOGY:   11/8/2021 FINDINGS: Four views (AP, Grashley, Scapular Y, and Axial) of the right shoulder were obtained in the office today and reviewed, revealing proximal humerus fracture without interval displacement, with interval healing. IMPRESSION: Proximal humerus fracture as above.      Electronically signed by Shila Baez MD        FINDINGS:  Three views (AP, Oblique, Lateral) of the left foot and 4 views (AP, Grashey, scapular, and axillary) of the right shoulder were obtained in the office today and reviewed, revealing fractures at the distal second and third metatarsal shafts with mild shortening, with interval healing without interval displacement. Displaced right proximal humerus fracture at the surgical neck with interval healing without interval displacement. IMPRESSION:  Osseous injury as above. Electronically signed by Pedro Carr MD      ASSESSMENT AND PLAN:  Body mass index is 19.49 kg/m². She has a right displaced proximal humerus fracture, sustained on 5/29/2021. She is doing well overall with conservative management, and reports no limitations or pain. She has a history of second and third distal metatarsal shaft fractures with mild shortening, sustained around 2/1/2021. She has healed well with conservative management. Notably, she has the past medical history as above. She has a history of hypothyroidism and osteoporosis. We had a discussion today about the likely diagnosis and its natural history, physical exam and imaging findings, as well as various treatment options in detail. Surgically, we discussed a possible right shoulder surgery in the future, and she reports that she is not interested in anything surgical.  We decided to continue with conservative management. Orders/referrals were placed as below at today's visit. She will continue her home exercises per physical therapy. All questions were answered and the above plan was agreed upon. The patient will return to clinic in the future as needed.            At the patient's next visit, depending on how the patient is doing and/or new imaging/labs results, we may consider the following options:    []  Orthotic (OTC)     []  Orthotic (custom)          []  Rocker bottom shoes     []  Brace (OTC)        []  Brace (custom)             []  CAM boot        []  Night splint         []  Heel cups        []  Strap      []  Toe sleeves/splints    []  PT:                     []  Wean out of immobilization   []  Advance activity       []  Topical               []  NSAIDs          []  Josiah         []  Referral:         []  Stress xrays       []  CT         []  MRI        []  Injection:         []  Consider OR      []  Pick OR date    No follow-ups on file. No orders of the defined types were placed in this encounter. No orders of the defined types were placed in this encounter. Leighton Mendenhall MD  Orthopedic Surgery        Please excuse any typos/errors, as this note was created with the assistance of voice recognition software. While intending to generate a document that actually reflects the content of the visit, the document can still have some errors including those of syntax and sound-a-like substitutions which may escape proof reading. In such instances, actual meaning can be extrapolated by context.

## 2025-01-16 ENCOUNTER — HOSPITAL ENCOUNTER (OUTPATIENT)
Age: 85
Setting detail: SPECIMEN
Discharge: HOME | End: 2025-01-16

## 2025-01-16 LAB
25(OH)D3 SERPL-MCNC: 50.7 NG/ML (ref 30–100)
ALBUMIN SERPL-MCNC: 4.3 G/DL (ref 3.5–5.2)
ALBUMIN/GLOB SERPL: 1.1 {RATIO} (ref 1–2.5)
ALP SERPL-CCNC: 116 U/L (ref 35–104)
ALT SERPL-CCNC: 16 U/L (ref 10–35)
ANION GAP SERPL CALCULATED.3IONS-SCNC: 12 MMOL/L (ref 9–16)
AST SERPL-CCNC: 28 U/L (ref 10–35)
BASOPHILS # BLD: 0.08 K/UL (ref 0–0.2)
BASOPHILS NFR BLD: 1 % (ref 0–2)
BILIRUB SERPL-MCNC: 0.4 MG/DL (ref 0–1.2)
BILIRUB UR QL STRIP: NEGATIVE
BUN SERPL-MCNC: 12 MG/DL (ref 8–23)
CALCIUM SERPL-MCNC: 10.3 MG/DL (ref 8.6–10.4)
CHLORIDE SERPL-SCNC: 102 MMOL/L (ref 98–107)
CHOLEST SERPL-MCNC: 183 MG/DL (ref 0–199)
CHOLESTEROL/HDL RATIO: 2.3
CLARITY UR: CLEAR
CO2 SERPL-SCNC: 27 MMOL/L (ref 20–31)
COLOR UR: YELLOW
COMMENT: NORMAL
CREAT SERPL-MCNC: 0.7 MG/DL (ref 0.6–0.9)
EOSINOPHIL # BLD: 0.31 K/UL (ref 0–0.44)
EOSINOPHILS RELATIVE PERCENT: 4 % (ref 1–4)
ERYTHROCYTE [DISTWIDTH] IN BLOOD BY AUTOMATED COUNT: 13.2 % (ref 11.8–14.4)
EST. AVERAGE GLUCOSE BLD GHB EST-MCNC: 100 MG/DL
FOLATE SERPL-MCNC: 34.1 NG/ML (ref 4.8–24.2)
GFR, ESTIMATED: 84 ML/MIN/1.73M2
GLUCOSE SERPL-MCNC: 98 MG/DL (ref 74–99)
GLUCOSE UR STRIP-MCNC: NEGATIVE MG/DL
HBA1C MFR BLD: 5.1 % (ref 4–6)
HCT VFR BLD AUTO: 39.2 % (ref 36.3–47.1)
HDLC SERPL-MCNC: 81 MG/DL
HGB BLD-MCNC: 12.6 G/DL (ref 11.9–15.1)
HGB UR QL STRIP.AUTO: NEGATIVE
IMM GRANULOCYTES # BLD AUTO: <0.03 K/UL (ref 0–0.3)
IMM GRANULOCYTES NFR BLD: 0 %
KETONES UR STRIP-MCNC: NEGATIVE MG/DL
LDLC SERPL CALC-MCNC: 88 MG/DL (ref 0–100)
LEUKOCYTE ESTERASE UR QL STRIP: NEGATIVE
LYMPHOCYTES NFR BLD: 3.94 K/UL (ref 1.1–3.7)
LYMPHOCYTES RELATIVE PERCENT: 51 % (ref 24–43)
MAGNESIUM SERPL-MCNC: 2.2 MG/DL (ref 1.6–2.4)
MCH RBC QN AUTO: 28.7 PG (ref 25.2–33.5)
MCHC RBC AUTO-ENTMCNC: 32.1 G/DL (ref 28.4–34.8)
MCV RBC AUTO: 89.3 FL (ref 82.6–102.9)
MONOCYTES NFR BLD: 0.69 K/UL (ref 0.1–1.2)
MONOCYTES NFR BLD: 9 % (ref 3–12)
NEUTROPHILS NFR BLD: 35 % (ref 36–65)
NEUTS SEG NFR BLD: 2.75 K/UL (ref 1.5–8.1)
NITRITE UR QL STRIP: NEGATIVE
NRBC BLD-RTO: 0 PER 100 WBC
PH UR STRIP: 7.5 [PH] (ref 5–8)
PHOSPHATE SERPL-MCNC: 3.4 MG/DL (ref 2.5–4.5)
PLATELET # BLD AUTO: 208 K/UL (ref 138–453)
PMV BLD AUTO: 10.6 FL (ref 8.1–13.5)
POTASSIUM SERPL-SCNC: 4.2 MMOL/L (ref 3.7–5.3)
PROT SERPL-MCNC: 8.3 G/DL (ref 6.6–8.7)
PROT UR STRIP-MCNC: NEGATIVE MG/DL
RBC # BLD AUTO: 4.39 M/UL (ref 3.95–5.11)
SODIUM SERPL-SCNC: 141 MMOL/L (ref 136–145)
SP GR UR STRIP: 1.01 (ref 1–1.03)
T4 FREE SERPL-MCNC: 1 NG/DL (ref 0.92–1.68)
TRIGL SERPL-MCNC: 72 MG/DL
TSH SERPL DL<=0.05 MIU/L-ACNC: 8.67 UIU/ML (ref 0.27–4.2)
URATE SERPL-MCNC: 3.1 MG/DL (ref 2.4–5.7)
UROBILINOGEN UR STRIP-ACNC: NORMAL EU/DL (ref 0–1)
VIT B12 SERPL-MCNC: 1463 PG/ML (ref 232–1245)
VLDLC SERPL CALC-MCNC: 14 MG/DL (ref 1–30)
WBC OTHER # BLD: 7.8 K/UL (ref 3.5–11.3)

## 2025-01-17 LAB
MICROORGANISM SPEC CULT: NO GROWTH
SPECIMEN DESCRIPTION: NORMAL